# Patient Record
Sex: MALE | Race: WHITE | NOT HISPANIC OR LATINO | URBAN - METROPOLITAN AREA
[De-identification: names, ages, dates, MRNs, and addresses within clinical notes are randomized per-mention and may not be internally consistent; named-entity substitution may affect disease eponyms.]

---

## 2017-04-17 ENCOUNTER — EMERGENCY (EMERGENCY)
Facility: HOSPITAL | Age: 62
LOS: 1 days | Discharge: HOME | End: 2017-04-17
Attending: EMERGENCY MEDICINE | Admitting: EMERGENCY MEDICINE
Payer: MEDICARE

## 2017-04-17 VITALS
SYSTOLIC BLOOD PRESSURE: 115 MMHG | RESPIRATION RATE: 18 BRPM | HEART RATE: 96 BPM | DIASTOLIC BLOOD PRESSURE: 65 MMHG | OXYGEN SATURATION: 102 % | TEMPERATURE: 100 F

## 2017-04-17 VITALS
DIASTOLIC BLOOD PRESSURE: 77 MMHG | SYSTOLIC BLOOD PRESSURE: 122 MMHG | OXYGEN SATURATION: 94 % | HEART RATE: 85 BPM | RESPIRATION RATE: 17 BRPM

## 2017-04-17 DIAGNOSIS — R50.9 FEVER, UNSPECIFIED: ICD-10-CM

## 2017-04-17 DIAGNOSIS — Z41.9 ENCOUNTER FOR PROCEDURE FOR PURPOSES OTHER THAN REMEDYING HEALTH STATE, UNSPECIFIED: Chronic | ICD-10-CM

## 2017-04-17 DIAGNOSIS — H26.40 UNSPECIFIED SECONDARY CATARACT: Chronic | ICD-10-CM

## 2017-04-17 LAB
ALBUMIN SERPL ELPH-MCNC: 3.7 G/DL — SIGNIFICANT CHANGE UP (ref 3.3–5)
ALP SERPL-CCNC: 65 U/L — SIGNIFICANT CHANGE UP (ref 40–120)
ALT FLD-CCNC: 16 U/L RC — SIGNIFICANT CHANGE UP (ref 10–45)
ANION GAP SERPL CALC-SCNC: 13 MMOL/L — SIGNIFICANT CHANGE UP (ref 5–17)
AST SERPL-CCNC: 15 U/L — SIGNIFICANT CHANGE UP (ref 10–40)
BASOPHILS # BLD AUTO: 0.1 K/UL — SIGNIFICANT CHANGE UP (ref 0–0.2)
BASOPHILS NFR BLD AUTO: 0 % — SIGNIFICANT CHANGE UP (ref 0–2)
BILIRUB SERPL-MCNC: 0.2 MG/DL — SIGNIFICANT CHANGE UP (ref 0.2–1.2)
BUN SERPL-MCNC: 14 MG/DL — SIGNIFICANT CHANGE UP (ref 7–23)
CALCIUM SERPL-MCNC: 8.3 MG/DL — LOW (ref 8.4–10.5)
CHLORIDE SERPL-SCNC: 101 MMOL/L — SIGNIFICANT CHANGE UP (ref 96–108)
CO2 SERPL-SCNC: 22 MMOL/L — SIGNIFICANT CHANGE UP (ref 22–31)
CREAT SERPL-MCNC: 0.8 MG/DL — SIGNIFICANT CHANGE UP (ref 0.5–1.3)
EOSINOPHIL # BLD AUTO: 0 K/UL — SIGNIFICANT CHANGE UP (ref 0–0.5)
EOSINOPHIL NFR BLD AUTO: 0 % — SIGNIFICANT CHANGE UP (ref 0–6)
GAS PNL BLDV: SIGNIFICANT CHANGE UP
GLUCOSE SERPL-MCNC: 135 MG/DL — HIGH (ref 70–99)
HCT VFR BLD CALC: 36.1 % — LOW (ref 39–50)
HGB BLD-MCNC: 11.4 G/DL — LOW (ref 13–17)
HPIV3 RNA SPEC QL NAA+PROBE: DETECTED
LYMPHOCYTES # BLD AUTO: 0.4 K/UL — LOW (ref 1–3.3)
LYMPHOCYTES # BLD AUTO: 7 % — LOW (ref 13–44)
MCHC RBC-ENTMCNC: 30.2 PG — SIGNIFICANT CHANGE UP (ref 27–34)
MCHC RBC-ENTMCNC: 31.7 GM/DL — LOW (ref 32–36)
MCV RBC AUTO: 95.5 FL — SIGNIFICANT CHANGE UP (ref 80–100)
MONOCYTES # BLD AUTO: 1.4 K/UL — HIGH (ref 0–0.9)
MONOCYTES NFR BLD AUTO: 18 % — HIGH (ref 2–14)
NEUTROPHILS # BLD AUTO: 4.3 K/UL — SIGNIFICANT CHANGE UP (ref 1.8–7.4)
NEUTROPHILS NFR BLD AUTO: 73 % — SIGNIFICANT CHANGE UP (ref 43–77)
NEUTS BAND # BLD: 2 % — SIGNIFICANT CHANGE UP (ref 0–8)
PLAT MORPH BLD: NORMAL — SIGNIFICANT CHANGE UP
PLATELET # BLD AUTO: 222 K/UL — SIGNIFICANT CHANGE UP (ref 150–400)
POTASSIUM SERPL-MCNC: 3.8 MMOL/L — SIGNIFICANT CHANGE UP (ref 3.5–5.3)
POTASSIUM SERPL-SCNC: 3.8 MMOL/L — SIGNIFICANT CHANGE UP (ref 3.5–5.3)
PROT SERPL-MCNC: 7.2 G/DL — SIGNIFICANT CHANGE UP (ref 6–8.3)
RAPID RVP RESULT: DETECTED
RBC # BLD: 3.78 M/UL — LOW (ref 4.2–5.8)
RBC # FLD: 16.6 % — HIGH (ref 10.3–14.5)
RBC BLD AUTO: SIGNIFICANT CHANGE UP
SODIUM SERPL-SCNC: 136 MMOL/L — SIGNIFICANT CHANGE UP (ref 135–145)
TOXIC GRANULES BLD QL SMEAR: PRESENT — SIGNIFICANT CHANGE UP
WBC # BLD: 6.3 K/UL — SIGNIFICANT CHANGE UP (ref 3.8–10.5)
WBC # FLD AUTO: 6.3 K/UL — SIGNIFICANT CHANGE UP (ref 3.8–10.5)

## 2017-04-17 PROCEDURE — 99284 EMERGENCY DEPT VISIT MOD MDM: CPT | Mod: 25,GC

## 2017-04-17 PROCEDURE — 93010 ELECTROCARDIOGRAM REPORT: CPT | Mod: GC

## 2017-04-17 PROCEDURE — 71020: CPT | Mod: 26

## 2017-04-17 RX ORDER — IPRATROPIUM/ALBUTEROL SULFATE 18-103MCG
3 AEROSOL WITH ADAPTER (GRAM) INHALATION ONCE
Qty: 0 | Refills: 0 | Status: COMPLETED | OUTPATIENT
Start: 2017-04-17 | End: 2017-04-17

## 2017-04-17 RX ORDER — CEFTRIAXONE 500 MG/1
1 INJECTION, POWDER, FOR SOLUTION INTRAMUSCULAR; INTRAVENOUS EVERY 24 HOURS
Qty: 0 | Refills: 0 | Status: DISCONTINUED | OUTPATIENT
Start: 2017-04-17 | End: 2017-04-21

## 2017-04-17 RX ORDER — SODIUM CHLORIDE 9 MG/ML
1000 INJECTION INTRAMUSCULAR; INTRAVENOUS; SUBCUTANEOUS ONCE
Qty: 0 | Refills: 0 | Status: COMPLETED | OUTPATIENT
Start: 2017-04-17 | End: 2017-04-17

## 2017-04-17 RX ADMIN — SODIUM CHLORIDE 1000 MILLILITER(S): 9 INJECTION INTRAMUSCULAR; INTRAVENOUS; SUBCUTANEOUS at 13:50

## 2017-04-17 RX ADMIN — Medication 3 MILLILITER(S): at 13:40

## 2017-04-17 RX ADMIN — Medication 3 MILLILITER(S): at 13:58

## 2017-04-17 RX ADMIN — CEFTRIAXONE 100 GRAM(S): 500 INJECTION, POWDER, FOR SOLUTION INTRAMUSCULAR; INTRAVENOUS at 13:58

## 2017-04-17 RX ADMIN — Medication 3 MILLILITER(S): at 15:35

## 2017-04-17 NOTE — ED PROVIDER NOTE - OBJECTIVE STATEMENT
61 year old male with history of DM II, hypertension, multiple myeloma for 9 years, has been on IV chemotherapy every Friday, oral therapy, seen today by his oncologist Dr. Worthington in office complaining of cough, chest congestion, low grade fever of 100.8 degrees F yesterday. While patient was getting bloodwork today felt weak, dizzy, and almost passed out. No actual syncope, but still feels weak and dizzy.

## 2017-04-17 NOTE — ED PROVIDER NOTE - CARE PLAN
Principal Discharge DX:	Parainfluenza infection  Instructions for follow-up, activity and diet:	1. Drink plenty of fluids  2. Follow-up with your primary care doctor or medicine clinic at [phone] in 3-5 days   3. Follow-up with your oncologist within the next 3-5 days  4. Return immediately for any worsening or concerning symptoms Principal Discharge DX:	Parainfluenza infection  Instructions for follow-up, activity and diet:	1. Drink plenty of fluids  2. Follow-up with your primary care doctor or medicine clinic at [phone] in 3-5 days   3. Follow-up with your oncologist within the next 3-5 days  4. Return immediately for any worsening or concerning symptoms Principal Discharge DX:	Parainfluenza infection  Instructions for follow-up, activity and diet:	1. Drink plenty of fluids  2. Follow-up with your primary care doctor or medicine clinic at [phone] in 3-5 days   3. Follow-up with your oncologist within the next 3-5 days  4. Return immediately for any worsening or concerning symptoms

## 2017-04-17 NOTE — ED PROVIDER NOTE - PLAN OF CARE
1. Drink plenty of fluids  2. Follow-up with your primary care doctor or medicine clinic at [phone] in 3-5 days   3. Follow-up with your oncologist within the next 3-5 days  4. Return immediately for any worsening or concerning symptoms

## 2017-04-17 NOTE — ED ADULT NURSE NOTE - PSH
After cataract  left eye  Elective surgery  Back surgery. Kyphoplasty  Elective surgery  Stem cell transplant 2013.

## 2017-04-17 NOTE — ED PROVIDER NOTE - MEDICAL DECISION MAKING DETAILS
61 year old male with multiple medical issues and multiple myeloma with cough, fever, chills, chest congestion and near-syncopal episode. Rule out pneumonia. Blood cultures, blood work, IV antibiotics, admission to hospital. - Remi Spivey MD 61 year old male with multiple medical issues and multiple myeloma with cough, fever, chills, chest congestion and near-syncopal episode. Rule out pneumonia. Blood cultures, blood work, IV antibiotics, admission to hospital. -  ZR

## 2017-04-17 NOTE — ED ADULT NURSE NOTE - OBJECTIVE STATEMENT
62 y/o M, reported to ED from PMD office via EMS. A&Ox3, s/p near syncope. Pt reports that he went to his PMD office today when he wasn't feeling well. Pt reports that he started having a fever last night of 100.3. Pt reports that he treated the fever with Tylenol and it decreased his temp. Pt is afebrile upon arrival to ED with temp of 99.7 oral. Pt was at his PMD office getting blood taken when he almost collapsed. Pt reports that he has a hx of multiple myeloma.

## 2017-04-17 NOTE — ED PROVIDER NOTE - PROGRESS NOTE DETAILS
patient re-assessed. feels significantly improved. wishes to be d/c'd home. discussed proper follow-up and indications to return to ED. patient feels comfortable with plan and will be very cautious, will return if any worsening symptoms. - Remi Spivey MD

## 2017-04-17 NOTE — ED ADULT NURSE NOTE - PMH
DM (diabetes mellitus)    HTN (hypertension)    Hypercholesteremia    Multiple myeloma    Vitamin D deficiency

## 2017-04-19 ENCOUNTER — INPATIENT (INPATIENT)
Facility: HOSPITAL | Age: 62
LOS: 3 days | Discharge: HOME | DRG: 202 | End: 2017-04-23
Attending: INTERNAL MEDICINE | Admitting: INTERNAL MEDICINE
Payer: MEDICARE

## 2017-04-19 VITALS
TEMPERATURE: 101 F | HEART RATE: 111 BPM | OXYGEN SATURATION: 95 % | DIASTOLIC BLOOD PRESSURE: 82 MMHG | RESPIRATION RATE: 19 BRPM | SYSTOLIC BLOOD PRESSURE: 128 MMHG

## 2017-04-19 DIAGNOSIS — Z41.9 ENCOUNTER FOR PROCEDURE FOR PURPOSES OTHER THAN REMEDYING HEALTH STATE, UNSPECIFIED: Chronic | ICD-10-CM

## 2017-04-19 DIAGNOSIS — R50.9 FEVER, UNSPECIFIED: ICD-10-CM

## 2017-04-19 DIAGNOSIS — H26.40 UNSPECIFIED SECONDARY CATARACT: Chronic | ICD-10-CM

## 2017-04-19 LAB
ALBUMIN SERPL ELPH-MCNC: 3.9 G/DL — SIGNIFICANT CHANGE UP (ref 3.3–5)
ALP SERPL-CCNC: 61 U/L — SIGNIFICANT CHANGE UP (ref 40–120)
ALT FLD-CCNC: 17 U/L RC — SIGNIFICANT CHANGE UP (ref 10–45)
ANION GAP SERPL CALC-SCNC: 13 MMOL/L — SIGNIFICANT CHANGE UP (ref 5–17)
APTT BLD: 30.2 SEC — SIGNIFICANT CHANGE UP (ref 27.5–37.4)
AST SERPL-CCNC: 13 U/L — SIGNIFICANT CHANGE UP (ref 10–40)
BASE EXCESS BLDV CALC-SCNC: 1.6 MMOL/L — SIGNIFICANT CHANGE UP (ref -2–2)
BASOPHILS # BLD AUTO: 0 K/UL — SIGNIFICANT CHANGE UP (ref 0–0.2)
BASOPHILS NFR BLD AUTO: 0.2 % — SIGNIFICANT CHANGE UP (ref 0–2)
BILIRUB SERPL-MCNC: 0.3 MG/DL — SIGNIFICANT CHANGE UP (ref 0.2–1.2)
BUN SERPL-MCNC: 12 MG/DL — SIGNIFICANT CHANGE UP (ref 7–23)
CA-I SERPL-SCNC: 1.1 MMOL/L — LOW (ref 1.12–1.3)
CALCIUM SERPL-MCNC: 8.6 MG/DL — SIGNIFICANT CHANGE UP (ref 8.4–10.5)
CHLORIDE BLDV-SCNC: 103 MMOL/L — SIGNIFICANT CHANGE UP (ref 96–108)
CHLORIDE SERPL-SCNC: 97 MMOL/L — SIGNIFICANT CHANGE UP (ref 96–108)
CO2 BLDV-SCNC: 29 MMOL/L — SIGNIFICANT CHANGE UP (ref 22–30)
CO2 SERPL-SCNC: 25 MMOL/L — SIGNIFICANT CHANGE UP (ref 22–31)
CREAT SERPL-MCNC: 0.84 MG/DL — SIGNIFICANT CHANGE UP (ref 0.5–1.3)
EOSINOPHIL # BLD AUTO: 0 K/UL — SIGNIFICANT CHANGE UP (ref 0–0.5)
EOSINOPHIL NFR BLD AUTO: 0.1 % — SIGNIFICANT CHANGE UP (ref 0–6)
GAS PNL BLDV: 133 MMOL/L — LOW (ref 136–145)
GAS PNL BLDV: SIGNIFICANT CHANGE UP
GAS PNL BLDV: SIGNIFICANT CHANGE UP
GLUCOSE BLDV-MCNC: 160 MG/DL — HIGH (ref 70–99)
GLUCOSE SERPL-MCNC: 157 MG/DL — HIGH (ref 70–99)
GRAM STN FLD: SIGNIFICANT CHANGE UP
HCO3 BLDV-SCNC: 28 MMOL/L — SIGNIFICANT CHANGE UP (ref 21–29)
HCT VFR BLD CALC: 36.3 % — LOW (ref 39–50)
HCT VFR BLDA CALC: 37 % — LOW (ref 39–50)
HGB BLD CALC-MCNC: 12.1 G/DL — LOW (ref 13–17)
HGB BLD-MCNC: 11.8 G/DL — LOW (ref 13–17)
INR BLD: 2.12 RATIO — HIGH (ref 0.88–1.16)
LACTATE BLDV-MCNC: 2 MMOL/L — SIGNIFICANT CHANGE UP (ref 0.7–2)
LYMPHOCYTES # BLD AUTO: 0.4 K/UL — LOW (ref 1–3.3)
LYMPHOCYTES # BLD AUTO: 7.1 % — LOW (ref 13–44)
MCHC RBC-ENTMCNC: 30.8 PG — SIGNIFICANT CHANGE UP (ref 27–34)
MCHC RBC-ENTMCNC: 32.4 GM/DL — SIGNIFICANT CHANGE UP (ref 32–36)
MCV RBC AUTO: 95.2 FL — SIGNIFICANT CHANGE UP (ref 80–100)
MONOCYTES # BLD AUTO: 1.2 K/UL — HIGH (ref 0–0.9)
MONOCYTES NFR BLD AUTO: 21.4 % — HIGH (ref 2–14)
NEUTROPHILS # BLD AUTO: 4 K/UL — SIGNIFICANT CHANGE UP (ref 1.8–7.4)
NEUTROPHILS NFR BLD AUTO: 71.2 % — SIGNIFICANT CHANGE UP (ref 43–77)
OTHER CELLS CSF MANUAL: 5 ML/DL — LOW (ref 18–22)
PCO2 BLDV: 52 MMHG — HIGH (ref 35–50)
PH BLDV: 7.35 — SIGNIFICANT CHANGE UP (ref 7.35–7.45)
PLAT MORPH BLD: NORMAL — SIGNIFICANT CHANGE UP
PLATELET # BLD AUTO: 180 K/UL — SIGNIFICANT CHANGE UP (ref 150–400)
PO2 BLDV: 22 MMHG — LOW (ref 25–45)
POTASSIUM BLDV-SCNC: 3.9 MMOL/L — SIGNIFICANT CHANGE UP (ref 3.5–5)
POTASSIUM SERPL-MCNC: 4.3 MMOL/L — SIGNIFICANT CHANGE UP (ref 3.5–5.3)
POTASSIUM SERPL-SCNC: 4.3 MMOL/L — SIGNIFICANT CHANGE UP (ref 3.5–5.3)
PROT SERPL-MCNC: 7.5 G/DL — SIGNIFICANT CHANGE UP (ref 6–8.3)
PROTHROM AB SERPL-ACNC: 23.2 SEC — HIGH (ref 9.8–12.7)
RBC # BLD: 3.82 M/UL — LOW (ref 4.2–5.8)
RBC # FLD: 16.5 % — HIGH (ref 10.3–14.5)
RBC BLD AUTO: NORMAL — SIGNIFICANT CHANGE UP
SAO2 % BLDV: 31 % — LOW (ref 67–88)
SODIUM SERPL-SCNC: 135 MMOL/L — SIGNIFICANT CHANGE UP (ref 135–145)
WBC # BLD: 5.7 K/UL — SIGNIFICANT CHANGE UP (ref 3.8–10.5)
WBC # FLD AUTO: 5.7 K/UL — SIGNIFICANT CHANGE UP (ref 3.8–10.5)

## 2017-04-19 PROCEDURE — 71020: CPT | Mod: 26

## 2017-04-19 PROCEDURE — 99285 EMERGENCY DEPT VISIT HI MDM: CPT

## 2017-04-19 RX ORDER — ASPIRIN/CALCIUM CARB/MAGNESIUM 324 MG
325 TABLET ORAL DAILY
Qty: 0 | Refills: 0 | Status: DISCONTINUED | OUTPATIENT
Start: 2017-04-19 | End: 2017-04-19

## 2017-04-19 RX ORDER — INSULIN LISPRO 100/ML
VIAL (ML) SUBCUTANEOUS
Qty: 0 | Refills: 0 | Status: DISCONTINUED | OUTPATIENT
Start: 2017-04-19 | End: 2017-04-23

## 2017-04-19 RX ORDER — ASPIRIN/CALCIUM CARB/MAGNESIUM 324 MG
81 TABLET ORAL DAILY
Qty: 0 | Refills: 0 | Status: DISCONTINUED | OUTPATIENT
Start: 2017-04-19 | End: 2017-04-23

## 2017-04-19 RX ORDER — TIOTROPIUM BROMIDE 18 UG/1
1 CAPSULE ORAL; RESPIRATORY (INHALATION) DAILY
Qty: 0 | Refills: 0 | Status: DISCONTINUED | OUTPATIENT
Start: 2017-04-19 | End: 2017-04-23

## 2017-04-19 RX ORDER — DEXTROSE 50 % IN WATER 50 %
25 SYRINGE (ML) INTRAVENOUS ONCE
Qty: 0 | Refills: 0 | Status: DISCONTINUED | OUTPATIENT
Start: 2017-04-19 | End: 2017-04-23

## 2017-04-19 RX ORDER — IPRATROPIUM/ALBUTEROL SULFATE 18-103MCG
3 AEROSOL WITH ADAPTER (GRAM) INHALATION EVERY 6 HOURS
Qty: 0 | Refills: 0 | Status: DISCONTINUED | OUTPATIENT
Start: 2017-04-19 | End: 2017-04-22

## 2017-04-19 RX ORDER — ACYCLOVIR SODIUM 500 MG
400 VIAL (EA) INTRAVENOUS DAILY
Qty: 0 | Refills: 0 | Status: DISCONTINUED | OUTPATIENT
Start: 2017-04-19 | End: 2017-04-23

## 2017-04-19 RX ORDER — GLUCAGON INJECTION, SOLUTION 0.5 MG/.1ML
1 INJECTION, SOLUTION SUBCUTANEOUS ONCE
Qty: 0 | Refills: 0 | Status: DISCONTINUED | OUTPATIENT
Start: 2017-04-19 | End: 2017-04-23

## 2017-04-19 RX ORDER — CEFEPIME 1 G/1
INJECTION, POWDER, FOR SOLUTION INTRAMUSCULAR; INTRAVENOUS
Qty: 0 | Refills: 0 | Status: DISCONTINUED | OUTPATIENT
Start: 2017-04-19 | End: 2017-04-20

## 2017-04-19 RX ORDER — SODIUM CHLORIDE 9 MG/ML
2000 INJECTION INTRAMUSCULAR; INTRAVENOUS; SUBCUTANEOUS ONCE
Qty: 0 | Refills: 0 | Status: COMPLETED | OUTPATIENT
Start: 2017-04-19 | End: 2017-04-19

## 2017-04-19 RX ORDER — WARFARIN SODIUM 2.5 MG/1
5 TABLET ORAL ONCE
Qty: 0 | Refills: 0 | Status: COMPLETED | OUTPATIENT
Start: 2017-04-19 | End: 2017-04-19

## 2017-04-19 RX ORDER — CEFEPIME 1 G/1
2000 INJECTION, POWDER, FOR SOLUTION INTRAMUSCULAR; INTRAVENOUS EVERY 12 HOURS
Qty: 0 | Refills: 0 | Status: DISCONTINUED | OUTPATIENT
Start: 2017-04-20 | End: 2017-04-20

## 2017-04-19 RX ORDER — SENNA PLUS 8.6 MG/1
2 TABLET ORAL AT BEDTIME
Qty: 0 | Refills: 0 | Status: DISCONTINUED | OUTPATIENT
Start: 2017-04-19 | End: 2017-04-23

## 2017-04-19 RX ORDER — CEFEPIME 1 G/1
2000 INJECTION, POWDER, FOR SOLUTION INTRAMUSCULAR; INTRAVENOUS ONCE
Qty: 0 | Refills: 0 | Status: COMPLETED | OUTPATIENT
Start: 2017-04-19 | End: 2017-04-19

## 2017-04-19 RX ORDER — SODIUM CHLORIDE 9 MG/ML
1000 INJECTION, SOLUTION INTRAVENOUS
Qty: 0 | Refills: 0 | Status: DISCONTINUED | OUTPATIENT
Start: 2017-04-19 | End: 2017-04-23

## 2017-04-19 RX ORDER — ZOLPIDEM TARTRATE 10 MG/1
5 TABLET ORAL AT BEDTIME
Qty: 0 | Refills: 0 | Status: DISCONTINUED | OUTPATIENT
Start: 2017-04-19 | End: 2017-04-23

## 2017-04-19 RX ORDER — SODIUM CHLORIDE 9 MG/ML
1000 INJECTION INTRAMUSCULAR; INTRAVENOUS; SUBCUTANEOUS
Qty: 0 | Refills: 0 | Status: DISCONTINUED | OUTPATIENT
Start: 2017-04-19 | End: 2017-04-23

## 2017-04-19 RX ORDER — ACETAMINOPHEN 500 MG
1000 TABLET ORAL ONCE
Qty: 0 | Refills: 0 | Status: COMPLETED | OUTPATIENT
Start: 2017-04-19 | End: 2017-04-19

## 2017-04-19 RX ORDER — VANCOMYCIN HCL 1 G
1000 VIAL (EA) INTRAVENOUS EVERY 12 HOURS
Qty: 0 | Refills: 0 | Status: DISCONTINUED | OUTPATIENT
Start: 2017-04-19 | End: 2017-04-21

## 2017-04-19 RX ORDER — IPRATROPIUM/ALBUTEROL SULFATE 18-103MCG
3 AEROSOL WITH ADAPTER (GRAM) INHALATION ONCE
Qty: 0 | Refills: 0 | Status: COMPLETED | OUTPATIENT
Start: 2017-04-19 | End: 2017-04-19

## 2017-04-19 RX ORDER — DEXTROSE 50 % IN WATER 50 %
1 SYRINGE (ML) INTRAVENOUS ONCE
Qty: 0 | Refills: 0 | Status: DISCONTINUED | OUTPATIENT
Start: 2017-04-19 | End: 2017-04-23

## 2017-04-19 RX ORDER — METOPROLOL TARTRATE 50 MG
25 TABLET ORAL
Qty: 0 | Refills: 0 | Status: DISCONTINUED | OUTPATIENT
Start: 2017-04-19 | End: 2017-04-23

## 2017-04-19 RX ORDER — SIMVASTATIN 20 MG/1
20 TABLET, FILM COATED ORAL AT BEDTIME
Qty: 0 | Refills: 0 | Status: DISCONTINUED | OUTPATIENT
Start: 2017-04-19 | End: 2017-04-23

## 2017-04-19 RX ORDER — ALBUTEROL 90 UG/1
1 AEROSOL, METERED ORAL EVERY 4 HOURS
Qty: 0 | Refills: 0 | Status: DISCONTINUED | OUTPATIENT
Start: 2017-04-19 | End: 2017-04-22

## 2017-04-19 RX ORDER — DEXTROSE 50 % IN WATER 50 %
12.5 SYRINGE (ML) INTRAVENOUS ONCE
Qty: 0 | Refills: 0 | Status: DISCONTINUED | OUTPATIENT
Start: 2017-04-19 | End: 2017-04-23

## 2017-04-19 RX ORDER — VANCOMYCIN HCL 1 G
1000 VIAL (EA) INTRAVENOUS ONCE
Qty: 0 | Refills: 0 | Status: COMPLETED | OUTPATIENT
Start: 2017-04-19 | End: 2017-04-19

## 2017-04-19 RX ADMIN — ZOLPIDEM TARTRATE 5 MILLIGRAM(S): 10 TABLET ORAL at 22:04

## 2017-04-19 RX ADMIN — CEFEPIME 100 MILLIGRAM(S): 1 INJECTION, POWDER, FOR SOLUTION INTRAMUSCULAR; INTRAVENOUS at 13:50

## 2017-04-19 RX ADMIN — Medication 1: at 16:28

## 2017-04-19 RX ADMIN — SIMVASTATIN 20 MILLIGRAM(S): 20 TABLET, FILM COATED ORAL at 22:01

## 2017-04-19 RX ADMIN — Medication 400 MILLIGRAM(S): at 13:10

## 2017-04-19 RX ADMIN — Medication 250 MILLIGRAM(S): at 13:10

## 2017-04-19 RX ADMIN — SODIUM CHLORIDE 50 MILLILITER(S): 9 INJECTION INTRAMUSCULAR; INTRAVENOUS; SUBCUTANEOUS at 22:08

## 2017-04-19 RX ADMIN — Medication 3 MILLILITER(S): at 18:03

## 2017-04-19 RX ADMIN — Medication 40 MILLIGRAM(S): at 20:39

## 2017-04-19 RX ADMIN — Medication 3 MILLILITER(S): at 13:10

## 2017-04-19 RX ADMIN — Medication 25 MILLIGRAM(S): at 18:03

## 2017-04-19 RX ADMIN — SODIUM CHLORIDE 2000 MILLILITER(S): 9 INJECTION INTRAMUSCULAR; INTRAVENOUS; SUBCUTANEOUS at 13:10

## 2017-04-19 RX ADMIN — WARFARIN SODIUM 5 MILLIGRAM(S): 2.5 TABLET ORAL at 22:04

## 2017-04-19 RX ADMIN — SENNA PLUS 2 TABLET(S): 8.6 TABLET ORAL at 22:01

## 2017-04-19 RX ADMIN — Medication 81 MILLIGRAM(S): at 22:03

## 2017-04-19 RX ADMIN — Medication 400 MILLIGRAM(S): at 22:03

## 2017-04-19 NOTE — H&P ADULT. - HISTORY OF PRESENT ILLNESS
t: 61 y.o. male  with  h/o  myeloma, called  by  er  to  be  admitted   for + BC's of gram positive cocci in clusters.  Pt has a history of DM II, MM on pomalidomide, recent blood clot on coumadin.  Was in the ED several days ago for fever and generalized malaise, .  Was sent home but called back today.  Here today with worsening cough, febrile of 102 this morning. and  positive  blood  c/s,  pt  called  to  er  for  admission,    No chest pain or shortness of breath.  No belly pain, nausea or vomiting. t: 61 y.o. male  with  h/o  myeloma, called  by  er  to  be  admitted   for + BC's of gram positive cocci in clusters.  Pt has a history of DM II, MM on pomalidomide, recent blood clot on coumadin.  Was in the ED several days ago for fever and generalized malaise,parainfluemza positive,  .  Was sent home but called back today.  Here today with worsening cough, febrile of 102 this morning. and  positive  blood  c/s,  pt  called  to  er  for  admission,    No chest pain or shortness of breath.  No belly pain, nausea or vomiting.

## 2017-04-19 NOTE — ED PROVIDER NOTE - OBJECTIVE STATEMENT
61 y.o. male called back for + BC's of gram positive cocci in clusters.  Pt has a 61 y.o. male called back for + BC's of gram positive cocci in clusters.  Pt has a history of DM II, MM on pomalidomide, recent blood clot on coumadin.  Was in the ED several days ago for fever and generalized malaise, had normal BW normal CxR and + parainfluenza.  Was sent home but called back today.  Here today with worsening cough, febrile of 102 this morning.  No antipyretics at the moment.  No chest pain or shortness of breath.  No belly pain, nausea or vomiting.

## 2017-04-19 NOTE — ED PROVIDER NOTE - ATTENDING CONTRIBUTION TO CARE
I, Dr. Luis Carlos Ames, saw and examined this patient and discussed the plan of care with the PA. Pt called back for GPC 1/2 bottles, has parainfl.

## 2017-04-19 NOTE — ED ADULT NURSE NOTE - OBJECTIVE STATEMENT
Patient states that he was seen in the ED on Monday and was diagnosed with the flu. Patient states that he was discharged home and has been having increased SOB and wheezing. Patient states that he has also been running fevers at home of 101.0. Pt states that he took Tylenol this morning at 0600. Patient has audible wheezing noted at this time. Patient oxygen 93% on RA. Patient placed on oxygen for comfort. Patient denies any CP. Patient states that he was called at home and told that his blood cultures from Monday were positive. Patient A/Ox4

## 2017-04-19 NOTE — ED ADULT NURSE NOTE - CAS EDN DISCHARGE ASSESSMENT
Alert and oriented to person, place and time/Awake/Patient baseline mental status/No adverse reaction to first time med in ED

## 2017-04-19 NOTE — ED ADULT NURSE REASSESSMENT NOTE - NS ED NURSE REASSESS COMMENT FT1
Patient continues to rest in bed at this time with family at the bedside. Patient updated on progress. Patient states that he is still coughing and a little SOB. Patients respirations are even and non labored. NAD noted

## 2017-04-19 NOTE — H&P ADULT. - ASSESSMENT
pt  with  fevers, bacteremia,  on  cefepime,  and  vanco,  ID  called,  h/o  myeloma, htn,  dm  2, on  coumadin  for  h/o  [E/ DVT  dx 12.  16, onc  dr snider  will  f/p

## 2017-04-19 NOTE — ED ADULT TRIAGE NOTE - CHIEF COMPLAINT QUOTE
Called back for +blood cultures. Patient reporting cough, fever. Currently febrile, tachycardic Called back for +blood cultures. Patient reporting cough, fever. Currently febrile, tachycardic. On chemo

## 2017-04-19 NOTE — ED PROVIDER NOTE - CHPI ED SYMPTOMS NEG
no abdominal pain/no headache/no diarrhea/no shortness of breath/no vomiting/no decreased eating/drinking

## 2017-04-19 NOTE — ED POST DISCHARGE NOTE - RESULT SUMMARY
+Blood culture, called and spoke with patient and advised that he return to the ER as soon as possible, he states that he is feeling worse since he left the ER on monday and agreed that he will come in. Rich Li PA-C.

## 2017-04-19 NOTE — ED ADULT NURSE REASSESSMENT NOTE - NS ED NURSE REASSESS COMMENT FT1
Patient continues to rest in bed at this time with family at the bedside. Patient updated on progress. NAD noted

## 2017-04-19 NOTE — ED PROVIDER NOTE - CARE PLAN
Principal Discharge DX:	Fever  Secondary Diagnosis:	Multiple myeloma  Secondary Diagnosis:	Bacteremia

## 2017-04-19 NOTE — ED ADULT NURSE NOTE - CHIEF COMPLAINT QUOTE
Called back for +blood cultures. Patient reporting cough, fever. Currently febrile, tachycardic. Currently on chemo

## 2017-04-20 LAB
CULTURE RESULTS: SIGNIFICANT CHANGE UP
INR BLD: 1.86 RATIO — HIGH (ref 0.88–1.16)
PROTHROM AB SERPL-ACNC: 20.5 SEC — HIGH (ref 9.8–12.7)
SPECIMEN SOURCE: SIGNIFICANT CHANGE UP

## 2017-04-20 RX ORDER — WARFARIN SODIUM 2.5 MG/1
6 TABLET ORAL ONCE
Qty: 0 | Refills: 0 | Status: COMPLETED | OUTPATIENT
Start: 2017-04-20 | End: 2017-04-20

## 2017-04-20 RX ORDER — INSULIN LISPRO 100/ML
VIAL (ML) SUBCUTANEOUS AT BEDTIME
Qty: 0 | Refills: 0 | Status: DISCONTINUED | OUTPATIENT
Start: 2017-04-20 | End: 2017-04-23

## 2017-04-20 RX ADMIN — Medication 20 MILLIGRAM(S): at 06:58

## 2017-04-20 RX ADMIN — SIMVASTATIN 20 MILLIGRAM(S): 20 TABLET, FILM COATED ORAL at 21:55

## 2017-04-20 RX ADMIN — Medication 1 DROP(S): at 13:29

## 2017-04-20 RX ADMIN — Medication 1 DROP(S): at 07:02

## 2017-04-20 RX ADMIN — Medication 1 DROP(S): at 16:52

## 2017-04-20 RX ADMIN — ZOLPIDEM TARTRATE 5 MILLIGRAM(S): 10 TABLET ORAL at 21:54

## 2017-04-20 RX ADMIN — Medication 20 MILLIGRAM(S): at 13:30

## 2017-04-20 RX ADMIN — Medication 20 MILLIGRAM(S): at 21:54

## 2017-04-20 RX ADMIN — Medication 1: at 13:29

## 2017-04-20 RX ADMIN — Medication 25 MILLIGRAM(S): at 16:51

## 2017-04-20 RX ADMIN — Medication 250 MILLIGRAM(S): at 16:51

## 2017-04-20 RX ADMIN — WARFARIN SODIUM 6 MILLIGRAM(S): 2.5 TABLET ORAL at 21:54

## 2017-04-20 RX ADMIN — Medication 20 MILLIGRAM(S): at 16:51

## 2017-04-20 RX ADMIN — Medication 250 MILLIGRAM(S): at 04:23

## 2017-04-20 RX ADMIN — Medication 25 MILLIGRAM(S): at 06:58

## 2017-04-20 RX ADMIN — Medication 3 MILLILITER(S): at 13:30

## 2017-04-20 RX ADMIN — Medication 3 MILLILITER(S): at 01:00

## 2017-04-20 RX ADMIN — SENNA PLUS 2 TABLET(S): 8.6 TABLET ORAL at 21:55

## 2017-04-20 RX ADMIN — Medication 1: at 09:06

## 2017-04-20 RX ADMIN — Medication 400 MILLIGRAM(S): at 13:29

## 2017-04-20 RX ADMIN — Medication 3 MILLILITER(S): at 06:57

## 2017-04-20 RX ADMIN — Medication 81 MILLIGRAM(S): at 13:30

## 2017-04-20 RX ADMIN — Medication 3 MILLILITER(S): at 17:37

## 2017-04-20 RX ADMIN — Medication 3: at 17:42

## 2017-04-20 RX ADMIN — CEFEPIME 100 MILLIGRAM(S): 1 INJECTION, POWDER, FOR SOLUTION INTRAMUSCULAR; INTRAVENOUS at 06:57

## 2017-04-20 NOTE — PATIENT PROFILE ADULT. - NS TRANSFER PATIENT BELONGINGS
Jewelry/Money (specify)/Clothing/Other belongings/wallet; reading glasses; $60.00/Cell Phone/PDA (specify)

## 2017-04-21 LAB
ANION GAP SERPL CALC-SCNC: 10 MMOL/L — SIGNIFICANT CHANGE UP (ref 5–17)
BUN SERPL-MCNC: 20 MG/DL — SIGNIFICANT CHANGE UP (ref 7–23)
CALCIUM SERPL-MCNC: 8.8 MG/DL — SIGNIFICANT CHANGE UP (ref 8.4–10.5)
CHLORIDE SERPL-SCNC: 105 MMOL/L — SIGNIFICANT CHANGE UP (ref 96–108)
CO2 SERPL-SCNC: 25 MMOL/L — SIGNIFICANT CHANGE UP (ref 22–31)
CREAT SERPL-MCNC: 0.78 MG/DL — SIGNIFICANT CHANGE UP (ref 0.5–1.3)
GLUCOSE SERPL-MCNC: 210 MG/DL — HIGH (ref 70–99)
HBA1C BLD-MCNC: 7.2 % — HIGH (ref 4–5.6)
HCT VFR BLD CALC: 34.9 % — LOW (ref 39–50)
HGB BLD-MCNC: 11.1 G/DL — LOW (ref 13–17)
INR BLD: 1.87 RATIO — HIGH (ref 0.88–1.16)
MCHC RBC-ENTMCNC: 30.5 PG — SIGNIFICANT CHANGE UP (ref 27–34)
MCHC RBC-ENTMCNC: 31.9 GM/DL — LOW (ref 32–36)
MCV RBC AUTO: 95.5 FL — SIGNIFICANT CHANGE UP (ref 80–100)
PLATELET # BLD AUTO: 170 K/UL — SIGNIFICANT CHANGE UP (ref 150–400)
POTASSIUM SERPL-MCNC: 4.8 MMOL/L — SIGNIFICANT CHANGE UP (ref 3.5–5.3)
POTASSIUM SERPL-SCNC: 4.8 MMOL/L — SIGNIFICANT CHANGE UP (ref 3.5–5.3)
PROTHROM AB SERPL-ACNC: 21.4 SEC — HIGH (ref 10–13.1)
RBC # BLD: 3.66 M/UL — LOW (ref 4.2–5.8)
RBC # FLD: 16.5 % — HIGH (ref 10.3–14.5)
SODIUM SERPL-SCNC: 140 MMOL/L — SIGNIFICANT CHANGE UP (ref 135–145)
VANCOMYCIN TROUGH SERPL-MCNC: 6.4 UG/ML — LOW (ref 10–20)
WBC # BLD: 3.6 K/UL — LOW (ref 3.8–10.5)
WBC # FLD AUTO: 3.6 K/UL — LOW (ref 3.8–10.5)

## 2017-04-21 RX ORDER — VANCOMYCIN HCL 1 G
1250 VIAL (EA) INTRAVENOUS EVERY 12 HOURS
Qty: 0 | Refills: 0 | Status: DISCONTINUED | OUTPATIENT
Start: 2017-04-21 | End: 2017-04-21

## 2017-04-21 RX ORDER — WARFARIN SODIUM 2.5 MG/1
6 TABLET ORAL ONCE
Qty: 0 | Refills: 0 | Status: COMPLETED | OUTPATIENT
Start: 2017-04-21 | End: 2017-04-21

## 2017-04-21 RX ADMIN — Medication 2: at 18:44

## 2017-04-21 RX ADMIN — Medication 81 MILLIGRAM(S): at 13:04

## 2017-04-21 RX ADMIN — Medication 20 MILLIGRAM(S): at 21:48

## 2017-04-21 RX ADMIN — Medication 1 DROP(S): at 13:04

## 2017-04-21 RX ADMIN — WARFARIN SODIUM 6 MILLIGRAM(S): 2.5 TABLET ORAL at 21:48

## 2017-04-21 RX ADMIN — Medication 3 MILLILITER(S): at 23:40

## 2017-04-21 RX ADMIN — Medication 3 MILLILITER(S): at 05:04

## 2017-04-21 RX ADMIN — Medication 20 MILLIGRAM(S): at 14:48

## 2017-04-21 RX ADMIN — Medication 25 MILLIGRAM(S): at 05:04

## 2017-04-21 RX ADMIN — Medication 3 MILLILITER(S): at 13:04

## 2017-04-21 RX ADMIN — Medication 20 MILLIGRAM(S): at 05:04

## 2017-04-21 RX ADMIN — Medication 400 MILLIGRAM(S): at 13:04

## 2017-04-21 RX ADMIN — Medication 2: at 14:39

## 2017-04-21 RX ADMIN — SENNA PLUS 2 TABLET(S): 8.6 TABLET ORAL at 21:48

## 2017-04-21 RX ADMIN — Medication 1 DROP(S): at 17:51

## 2017-04-21 RX ADMIN — ZOLPIDEM TARTRATE 5 MILLIGRAM(S): 10 TABLET ORAL at 21:48

## 2017-04-21 RX ADMIN — Medication 25 MILLIGRAM(S): at 17:52

## 2017-04-21 RX ADMIN — Medication 2: at 08:20

## 2017-04-21 RX ADMIN — Medication 166.67 MILLIGRAM(S): at 05:03

## 2017-04-21 RX ADMIN — SIMVASTATIN 20 MILLIGRAM(S): 20 TABLET, FILM COATED ORAL at 21:48

## 2017-04-21 RX ADMIN — Medication 3 MILLILITER(S): at 17:52

## 2017-04-21 RX ADMIN — Medication 1 DROP(S): at 05:04

## 2017-04-21 NOTE — PROVIDER CONTACT NOTE (MEDICATION) - SITUATION
Pt current IVPB Vanco dose 1,000mg q12h, Vanco trough was drawn pre 4th dose and resulted subtherapeutic 6.4

## 2017-04-21 NOTE — PROVIDER CONTACT NOTE (MEDICATION) - ACTION/TREATMENT ORDERED:
NP [place] aware. Will increase dose from Vanco IVPB 1,000mg q12h to Vanco 1,250mg q12h - first increased dose to be administered this AM. Will continue to monitor.

## 2017-04-22 ENCOUNTER — TRANSCRIPTION ENCOUNTER (OUTPATIENT)
Age: 62
End: 2017-04-22

## 2017-04-22 LAB
CRP SERPL-MCNC: 0.66 MG/DL — HIGH (ref 0–0.4)
CULTURE RESULTS: SIGNIFICANT CHANGE UP
ERYTHROCYTE [SEDIMENTATION RATE] IN BLOOD: 51 MM/HR — HIGH (ref 0–20)
HCT VFR BLD CALC: 34.3 % — LOW (ref 39–50)
HGB BLD-MCNC: 10.9 G/DL — LOW (ref 13–17)
INR BLD: 2.3 RATIO — HIGH (ref 0.88–1.16)
MCHC RBC-ENTMCNC: 30.1 PG — SIGNIFICANT CHANGE UP (ref 27–34)
MCHC RBC-ENTMCNC: 31.8 GM/DL — LOW (ref 32–36)
MCV RBC AUTO: 94.8 FL — SIGNIFICANT CHANGE UP (ref 80–100)
PLATELET # BLD AUTO: 192 K/UL — SIGNIFICANT CHANGE UP (ref 150–400)
PROTHROM AB SERPL-ACNC: 26.4 SEC — HIGH (ref 10–13.1)
RBC # BLD: 3.62 M/UL — LOW (ref 4.2–5.8)
RBC # FLD: 17 % — HIGH (ref 10.3–14.5)
SPECIMEN SOURCE: SIGNIFICANT CHANGE UP
WBC # BLD: 4.03 K/UL — SIGNIFICANT CHANGE UP (ref 3.8–10.5)
WBC # FLD AUTO: 4.03 K/UL — SIGNIFICANT CHANGE UP (ref 3.8–10.5)

## 2017-04-22 RX ORDER — SIMVASTATIN 20 MG/1
1 TABLET, FILM COATED ORAL
Qty: 0 | Refills: 0 | DISCHARGE
Start: 2017-04-22

## 2017-04-22 RX ORDER — METOPROLOL TARTRATE 50 MG
1 TABLET ORAL
Qty: 0 | Refills: 0 | DISCHARGE
Start: 2017-04-22

## 2017-04-22 RX ORDER — WARFARIN SODIUM 2.5 MG/1
5 TABLET ORAL ONCE
Qty: 0 | Refills: 0 | Status: COMPLETED | OUTPATIENT
Start: 2017-04-22 | End: 2017-04-22

## 2017-04-22 RX ORDER — ALBUTEROL 90 UG/1
2 AEROSOL, METERED ORAL EVERY 6 HOURS
Qty: 0 | Refills: 0 | Status: DISCONTINUED | OUTPATIENT
Start: 2017-04-22 | End: 2017-04-22

## 2017-04-22 RX ORDER — ASPIRIN/CALCIUM CARB/MAGNESIUM 324 MG
1 TABLET ORAL
Qty: 0 | Refills: 0 | DISCHARGE
Start: 2017-04-22

## 2017-04-22 RX ORDER — ACYCLOVIR SODIUM 500 MG
1 VIAL (EA) INTRAVENOUS
Qty: 0 | Refills: 0 | DISCHARGE
Start: 2017-04-22

## 2017-04-22 RX ORDER — SENNA PLUS 8.6 MG/1
2 TABLET ORAL
Qty: 0 | Refills: 0 | DISCHARGE
Start: 2017-04-22

## 2017-04-22 RX ADMIN — Medication 1 DROP(S): at 17:42

## 2017-04-22 RX ADMIN — Medication 1 DROP(S): at 12:42

## 2017-04-22 RX ADMIN — Medication 20 MILLIGRAM(S): at 13:33

## 2017-04-22 RX ADMIN — Medication 400 MILLIGRAM(S): at 12:42

## 2017-04-22 RX ADMIN — Medication 81 MILLIGRAM(S): at 12:42

## 2017-04-22 RX ADMIN — Medication 1 DROP(S): at 06:35

## 2017-04-22 RX ADMIN — Medication 25 MILLIGRAM(S): at 17:42

## 2017-04-22 RX ADMIN — SIMVASTATIN 20 MILLIGRAM(S): 20 TABLET, FILM COATED ORAL at 21:54

## 2017-04-22 RX ADMIN — Medication 1: at 13:33

## 2017-04-22 RX ADMIN — Medication 2: at 17:42

## 2017-04-22 RX ADMIN — Medication 20 MILLIGRAM(S): at 21:53

## 2017-04-22 RX ADMIN — WARFARIN SODIUM 5 MILLIGRAM(S): 2.5 TABLET ORAL at 21:54

## 2017-04-22 RX ADMIN — Medication 20 MILLIGRAM(S): at 06:36

## 2017-04-22 RX ADMIN — Medication 1: at 08:24

## 2017-04-22 RX ADMIN — ZOLPIDEM TARTRATE 5 MILLIGRAM(S): 10 TABLET ORAL at 21:54

## 2017-04-22 RX ADMIN — Medication 3 MILLILITER(S): at 06:35

## 2017-04-22 RX ADMIN — Medication 25 MILLIGRAM(S): at 06:38

## 2017-04-22 RX ADMIN — Medication 40 MILLIGRAM(S): at 12:42

## 2017-04-22 NOTE — DISCHARGE NOTE ADULT - HOSPITAL COURSE
sent  in  for  bacteremia,  bd cs/,  with  coag  neg  staph,  seen by  id,  antibiotics  stopped,  /  parainfluenza  positive,  with  wheezing,  acute  brochospasm  resiolving,  mueloma,  h/o  dvt  on  coumadin,  f/o  onc, next  week

## 2017-04-22 NOTE — DISCHARGE NOTE ADULT - MEDICATION SUMMARY - MEDICATIONS TO STOP TAKING
I will STOP taking the medications listed below when I get home from the hospital:    enoxaparin 100 mg/mL injectable solution  -- 100 milligram(s) injectable every 12 hours MDD:200 mg  -- It is very important that you take or use this exactly as directed.  Do not skip doses or discontinue unless directed by your doctor.

## 2017-04-22 NOTE — DISCHARGE NOTE ADULT - MEDICATION SUMMARY - MEDICATIONS TO TAKE
I will START or STAY ON the medications listed below when I get home from the hospital:    predniSONE 10 mg oral tablet  -- 4 tab(s) by mouth once a day X 5 days; 3 tabs by mouth once a day X 5 days; 2 tabs X 5 days; 1 tab by mouth X 5 days then stop  -- It is very important that you take or use this exactly as directed.  Do not skip doses or discontinue unless directed by your doctor.  Obtain medical advice before taking any non-prescription drugs as some may affect the action of this medication.  Take with food or milk.    -- Indication: For COPD    aspirin 81 mg oral delayed release tablet  -- 1 tab(s) by mouth once a day  -- Indication: For preventive measure    Coumadin 5 mg oral tablet  -- 1 tab(s) by mouth once a day MDD:1 tab  -- Do not take this drug if you are pregnant.  It is very important that you take or use this exactly as directed.  Do not skip doses or discontinue unless directed by your doctor.  Obtain medical advice before taking any non-prescription drugs as some may affect the action of this medication.    -- Indication: For DVT (deep venous thrombosis)    glimepiride 2 mg oral tablet  -- 1 tab(s) by mouth once a day  -- Indication: For Diabetese    simvastatin 20 mg oral tablet  -- 1 tab(s) by mouth once a day (at bedtime)  -- Indication: For high cholesterol    Tessalon Perles 100 mg oral capsule  -- 1 cap(s) by mouth 3 times a day MDD:3  -- May cause drowsiness.  Alcohol may intensify this effect.  Use care when operating dangerous machinery.  Swallow whole.  Do not crush.    -- Indication: For cough    acyclovir 400 mg oral tablet  -- 1 tab(s) by mouth once a day  -- Indication: For rash    metoprolol tartrate 25 mg oral tablet  -- 1 tab(s) by mouth 2 times a day  -- Indication: For CAD    Advair Diskus 250 mcg-50 mcg inhalation powder  -- 1 puff(s) inhaled 2 times a day MDD:2  -- Check with your doctor before becoming pregnant.  For inhalation only.  Obtain medical advice before taking any non-prescription drugs as some may affect the action of this medication.  Rinse mouth thoroughly after use.    -- Indication: For COPD    docusate sodium 100 mg oral capsule  -- 1 cap(s) by mouth 3 times a day  -- Indication: For costipation    senna oral tablet  -- 2 tab(s) by mouth once a day (at bedtime)  -- Indication: For costipation    ocular lubricant ophthalmic solution  -- 1 drop(s) to each affected eye 4 times a day  -- Indication: For Dry eyes

## 2017-04-22 NOTE — DISCHARGE NOTE ADULT - PATIENT PORTAL LINK FT
“You can access the Follow[facility] Patient Portal, offered by [facility], by registering with the following website: [email or web address]”

## 2017-04-22 NOTE — DISCHARGE NOTE ADULT - PLAN OF CARE
resolved completed the course of antibiotics in the hospital.  patient was seen by infectious disease team in the hospital.  If your cough worsens, you develop fever greater than 101', you have shaking chills, a fast heartbeat, trouble breathing and/or feel your are breathing much faster than usual, call your healthcare provider.  Make sure you wash your hands frequently.  Patient was positive for parainfluenza .  continue with steroids and nebulizer treatment as advised. you were on pomalidomide.  it was on hold while in the hospital.   Return to your oncologist in 2-3 days to resume your medication. continue with daily coumadin based on PT/INR.  Take your "blood thinners" as prescribed.  Walking is encouraged, increase activity as tolerated.  If you develop new leg pain, swelling, and/or redness contact your healthcare provider.  If you develop new chest pain with difficulty breathing, a rapid heart rate and/or a feeling of passing out call emergency medical services 911.

## 2017-04-22 NOTE — DISCHARGE NOTE ADULT - CARE PLAN
Principal Discharge DX:	Bacteremia  Goal:	resolved  Instructions for follow-up, activity and diet:	completed the course of antibiotics in the hospital.  patient was seen by infectious disease team in the hospital.  If your cough worsens, you develop fever greater than 101', you have shaking chills, a fast heartbeat, trouble breathing and/or feel your are breathing much faster than usual, call your healthcare provider.  Make sure you wash your hands frequently.  Patient was positive for parainfluenza .  continue with steroids and nebulizer treatment as advised.  Secondary Diagnosis:	Multiple myeloma  Instructions for follow-up, activity and diet:	you were on pomalidomide.  it was on hold while in the hospital.   Return to your oncologist in 2-3 days to resume your medication.  Secondary Diagnosis:	DVT (deep venous thrombosis)  Instructions for follow-up, activity and diet:	continue with daily coumadin based on PT/INR.  Take your "blood thinners" as prescribed.  Walking is encouraged, increase activity as tolerated.  If you develop new leg pain, swelling, and/or redness contact your healthcare provider.  If you develop new chest pain with difficulty breathing, a rapid heart rate and/or a feeling of passing out call emergency medical services 911.

## 2017-04-23 VITALS
TEMPERATURE: 98 F | DIASTOLIC BLOOD PRESSURE: 82 MMHG | RESPIRATION RATE: 18 BRPM | SYSTOLIC BLOOD PRESSURE: 120 MMHG | HEART RATE: 66 BPM | OXYGEN SATURATION: 94 %

## 2017-04-23 LAB
INR BLD: 2.42 RATIO — HIGH (ref 0.88–1.16)
PROTHROM AB SERPL-ACNC: 27.9 SEC — HIGH (ref 10–13.1)

## 2017-04-23 PROCEDURE — 80048 BASIC METABOLIC PNL TOTAL CA: CPT

## 2017-04-23 PROCEDURE — 94640 AIRWAY INHALATION TREATMENT: CPT

## 2017-04-23 PROCEDURE — 83036 HEMOGLOBIN GLYCOSYLATED A1C: CPT

## 2017-04-23 PROCEDURE — 83605 ASSAY OF LACTIC ACID: CPT

## 2017-04-23 PROCEDURE — 82803 BLOOD GASES ANY COMBINATION: CPT

## 2017-04-23 PROCEDURE — 99285 EMERGENCY DEPT VISIT HI MDM: CPT | Mod: 25

## 2017-04-23 PROCEDURE — 85610 PROTHROMBIN TIME: CPT

## 2017-04-23 PROCEDURE — 85014 HEMATOCRIT: CPT

## 2017-04-23 PROCEDURE — 99284 EMERGENCY DEPT VISIT MOD MDM: CPT | Mod: 25

## 2017-04-23 PROCEDURE — 85730 THROMBOPLASTIN TIME PARTIAL: CPT

## 2017-04-23 PROCEDURE — 93005 ELECTROCARDIOGRAM TRACING: CPT

## 2017-04-23 PROCEDURE — 80202 ASSAY OF VANCOMYCIN: CPT

## 2017-04-23 PROCEDURE — 71046 X-RAY EXAM CHEST 2 VIEWS: CPT

## 2017-04-23 PROCEDURE — 87581 M.PNEUMON DNA AMP PROBE: CPT

## 2017-04-23 PROCEDURE — 82947 ASSAY GLUCOSE BLOOD QUANT: CPT

## 2017-04-23 PROCEDURE — 80053 COMPREHEN METABOLIC PANEL: CPT

## 2017-04-23 PROCEDURE — 84295 ASSAY OF SERUM SODIUM: CPT

## 2017-04-23 PROCEDURE — 87633 RESP VIRUS 12-25 TARGETS: CPT

## 2017-04-23 PROCEDURE — 84132 ASSAY OF SERUM POTASSIUM: CPT

## 2017-04-23 PROCEDURE — 82330 ASSAY OF CALCIUM: CPT

## 2017-04-23 PROCEDURE — 85652 RBC SED RATE AUTOMATED: CPT

## 2017-04-23 PROCEDURE — 82435 ASSAY OF BLOOD CHLORIDE: CPT

## 2017-04-23 PROCEDURE — 96374 THER/PROPH/DIAG INJ IV PUSH: CPT

## 2017-04-23 PROCEDURE — 86140 C-REACTIVE PROTEIN: CPT

## 2017-04-23 PROCEDURE — 87486 CHLMYD PNEUM DNA AMP PROBE: CPT

## 2017-04-23 PROCEDURE — 87040 BLOOD CULTURE FOR BACTERIA: CPT

## 2017-04-23 PROCEDURE — 87798 DETECT AGENT NOS DNA AMP: CPT

## 2017-04-23 PROCEDURE — 96375 TX/PRO/DX INJ NEW DRUG ADDON: CPT

## 2017-04-23 PROCEDURE — 85027 COMPLETE CBC AUTOMATED: CPT

## 2017-04-23 RX ORDER — FLUTICASONE PROPIONATE AND SALMETEROL 50; 250 UG/1; UG/1
1 POWDER ORAL; RESPIRATORY (INHALATION)
Qty: 60 | Refills: 0
Start: 2017-04-23 | End: 2017-05-23

## 2017-04-23 RX ORDER — WARFARIN SODIUM 2.5 MG/1
5 TABLET ORAL ONCE
Qty: 0 | Refills: 0 | Status: DISCONTINUED | OUTPATIENT
Start: 2017-04-23 | End: 2017-04-23

## 2017-04-23 RX ORDER — ZOLPIDEM TARTRATE 10 MG/1
1 TABLET ORAL
Qty: 10 | Refills: 0 | OUTPATIENT
Start: 2017-04-23 | End: 2017-05-03

## 2017-04-23 RX ORDER — TIOTROPIUM BROMIDE 18 UG/1
1 CAPSULE ORAL; RESPIRATORY (INHALATION)
Qty: 1 | Refills: 0 | OUTPATIENT
Start: 2017-04-23 | End: 2017-05-23

## 2017-04-23 RX ADMIN — SODIUM CHLORIDE 50 MILLILITER(S): 9 INJECTION INTRAMUSCULAR; INTRAVENOUS; SUBCUTANEOUS at 07:16

## 2017-04-23 RX ADMIN — Medication 1 DROP(S): at 06:42

## 2017-04-23 RX ADMIN — Medication 1: at 08:34

## 2017-04-23 RX ADMIN — Medication 25 MILLIGRAM(S): at 06:42

## 2017-04-23 RX ADMIN — Medication 20 MILLIGRAM(S): at 06:42

## 2017-04-24 LAB
CULTURE RESULTS: SIGNIFICANT CHANGE UP
CULTURE RESULTS: SIGNIFICANT CHANGE UP
SPECIMEN SOURCE: SIGNIFICANT CHANGE UP
SPECIMEN SOURCE: SIGNIFICANT CHANGE UP
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
## 2019-01-13 NOTE — DISCHARGE NOTE ADULT - NSFTFHOMEHTHYNRD_GEN_ALL_CORE
Pt presents to the Ed for CC of left posterior leg pain and a posterior head pain. Pt states he has a hx of sciatica and occipital neuralgia. Pt notes the leg pain has been occurring for 2 weeks and the head pain started today. Pt ambulatory with pain. Pt took muscle relaxers and Vicodin one one PTA.   
No

## 2021-06-18 NOTE — PATIENT PROFILE ADULT. - FUNCTIONAL LEVEL PRIOR: TOILETING
A1C, CMP and FLP orders placed. Are these appropriate? Any others you'd like added?   (0) independent

## 2021-07-23 ENCOUNTER — NON-APPOINTMENT (OUTPATIENT)
Age: 66
End: 2021-07-23

## 2021-08-10 ENCOUNTER — NON-APPOINTMENT (OUTPATIENT)
Age: 66
End: 2021-08-10

## 2021-08-10 ENCOUNTER — APPOINTMENT (OUTPATIENT)
Dept: CARDIOLOGY | Facility: CLINIC | Age: 66
End: 2021-08-10
Payer: MEDICARE

## 2021-08-10 VITALS
DIASTOLIC BLOOD PRESSURE: 60 MMHG | WEIGHT: 233 LBS | SYSTOLIC BLOOD PRESSURE: 120 MMHG | OXYGEN SATURATION: 98 % | HEART RATE: 81 BPM | HEIGHT: 68 IN | BODY MASS INDEX: 35.31 KG/M2 | TEMPERATURE: 97.1 F

## 2021-08-10 DIAGNOSIS — Z87.01 PERSONAL HISTORY OF PNEUMONIA (RECURRENT): ICD-10-CM

## 2021-08-10 DIAGNOSIS — Z78.9 OTHER SPECIFIED HEALTH STATUS: ICD-10-CM

## 2021-08-10 DIAGNOSIS — Z79.899 OTHER LONG TERM (CURRENT) DRUG THERAPY: ICD-10-CM

## 2021-08-10 DIAGNOSIS — Z94.84 STEM CELLS TRANSPLANT STATUS: ICD-10-CM

## 2021-08-10 DIAGNOSIS — Z86.718 PERSONAL HISTORY OF OTHER VENOUS THROMBOSIS AND EMBOLISM: ICD-10-CM

## 2021-08-10 DIAGNOSIS — R91.8 OTHER NONSPECIFIC ABNORMAL FINDING OF LUNG FIELD: ICD-10-CM

## 2021-08-10 DIAGNOSIS — G47.30 SLEEP APNEA, UNSPECIFIED: ICD-10-CM

## 2021-08-10 DIAGNOSIS — Z87.891 PERSONAL HISTORY OF NICOTINE DEPENDENCE: ICD-10-CM

## 2021-08-10 DIAGNOSIS — R06.02 SHORTNESS OF BREATH: ICD-10-CM

## 2021-08-10 PROCEDURE — 93000 ELECTROCARDIOGRAM COMPLETE: CPT

## 2021-08-10 PROCEDURE — 99205 OFFICE O/P NEW HI 60 MIN: CPT

## 2021-08-16 RX ORDER — ATORVASTATIN CALCIUM 10 MG/1
10 TABLET, FILM COATED ORAL DAILY
Qty: 90 | Refills: 3 | Status: ACTIVE | COMMUNITY

## 2021-08-16 RX ORDER — SITAGLIPTIN 50 MG/1
50 TABLET, FILM COATED ORAL DAILY
Refills: 0 | Status: ACTIVE | COMMUNITY

## 2021-08-16 RX ORDER — ACYCLOVIR 400 MG/1
400 TABLET ORAL DAILY
Refills: 0 | Status: ACTIVE | COMMUNITY

## 2021-08-16 RX ORDER — GLIMEPIRIDE 2 MG/1
2 TABLET ORAL DAILY
Refills: 0 | Status: ACTIVE | COMMUNITY

## 2021-08-17 ENCOUNTER — NON-APPOINTMENT (OUTPATIENT)
Age: 66
End: 2021-08-17

## 2021-08-25 ENCOUNTER — APPOINTMENT (OUTPATIENT)
Dept: CARDIOLOGY | Facility: CLINIC | Age: 66
End: 2021-08-25
Payer: MEDICARE

## 2021-08-25 PROCEDURE — 93306 TTE W/DOPPLER COMPLETE: CPT

## 2021-08-27 ENCOUNTER — APPOINTMENT (OUTPATIENT)
Dept: CARDIOLOGY | Facility: CLINIC | Age: 66
End: 2021-08-27
Payer: MEDICARE

## 2021-08-27 PROCEDURE — 93015 CV STRESS TEST SUPVJ I&R: CPT

## 2021-08-27 PROCEDURE — A9502: CPT

## 2021-08-27 PROCEDURE — 78452 HT MUSCLE IMAGE SPECT MULT: CPT

## 2021-09-14 ENCOUNTER — APPOINTMENT (OUTPATIENT)
Dept: CARDIOLOGY | Facility: CLINIC | Age: 66
End: 2021-09-14
Payer: MEDICARE

## 2021-09-14 VITALS
TEMPERATURE: 97.3 F | HEIGHT: 68 IN | HEART RATE: 67 BPM | SYSTOLIC BLOOD PRESSURE: 110 MMHG | BODY MASS INDEX: 36.37 KG/M2 | DIASTOLIC BLOOD PRESSURE: 70 MMHG | OXYGEN SATURATION: 97 % | WEIGHT: 240 LBS

## 2021-09-14 DIAGNOSIS — I83.90 ASYMPTOMATIC VARICOSE VEINS OF UNSPECIFIED LOWER EXTREMITY: ICD-10-CM

## 2021-09-14 PROCEDURE — 99214 OFFICE O/P EST MOD 30 MIN: CPT

## 2021-09-14 RX ORDER — DILTIAZEM HYDROCHLORIDE 120 MG/1
120 CAPSULE, COATED, EXTENDED RELEASE ORAL
Qty: 180 | Refills: 3 | Status: DISCONTINUED | COMMUNITY
End: 2021-09-14

## 2021-09-14 RX ORDER — METOPROLOL SUCCINATE 25 MG/1
25 TABLET, EXTENDED RELEASE ORAL
Qty: 90 | Refills: 1 | Status: DISCONTINUED | COMMUNITY
End: 2021-09-14

## 2021-09-14 RX ORDER — METFORMIN HYDROCHLORIDE 500 MG/1
500 TABLET, FILM COATED, EXTENDED RELEASE ORAL TWICE DAILY
Refills: 0 | Status: ACTIVE | COMMUNITY

## 2021-09-14 RX ORDER — CALCIUM CARBONATE 300MG(750)
1000 TABLET,CHEWABLE ORAL DAILY
Refills: 0 | Status: ACTIVE | COMMUNITY

## 2021-09-14 RX ORDER — DEXAMETHASONE 4 MG/1
4 TABLET ORAL
Refills: 0 | Status: ACTIVE | COMMUNITY

## 2021-09-14 RX ORDER — POMALIDOMIDE 4 MG/1
4 CAPSULE ORAL
Refills: 0 | Status: ACTIVE | COMMUNITY

## 2021-09-14 RX ORDER — CHROMIUM 200 MCG
25 MCG TABLET ORAL DAILY
Refills: 0 | Status: ACTIVE | COMMUNITY

## 2021-09-14 RX ORDER — WARFARIN 5 MG/1
5 TABLET ORAL AS DIRECTED
Refills: 3 | Status: DISCONTINUED | COMMUNITY
End: 2021-09-14

## 2021-09-14 RX ORDER — WARFARIN 7.5 MG/1
7.5 TABLET ORAL AS DIRECTED
Refills: 0 | Status: DISCONTINUED | COMMUNITY
Start: 2021-08-16 | End: 2021-09-14

## 2021-12-29 ENCOUNTER — RX RENEWAL (OUTPATIENT)
Age: 66
End: 2021-12-29

## 2022-01-11 ENCOUNTER — APPOINTMENT (OUTPATIENT)
Dept: CARDIOLOGY | Facility: CLINIC | Age: 67
End: 2022-01-11
Payer: MEDICARE

## 2022-01-11 PROCEDURE — 99442: CPT | Mod: 95

## 2022-06-27 ENCOUNTER — RX RENEWAL (OUTPATIENT)
Age: 67
End: 2022-06-27

## 2022-07-12 ENCOUNTER — NON-APPOINTMENT (OUTPATIENT)
Age: 67
End: 2022-07-12

## 2022-07-12 ENCOUNTER — APPOINTMENT (OUTPATIENT)
Dept: CARDIOLOGY | Facility: CLINIC | Age: 67
End: 2022-07-12

## 2022-07-12 VITALS
TEMPERATURE: 98.2 F | DIASTOLIC BLOOD PRESSURE: 72 MMHG | OXYGEN SATURATION: 96 % | HEIGHT: 68 IN | HEART RATE: 85 BPM | SYSTOLIC BLOOD PRESSURE: 136 MMHG | RESPIRATION RATE: 147 BRPM | WEIGHT: 246 LBS | BODY MASS INDEX: 37.28 KG/M2

## 2022-07-12 PROCEDURE — 93000 ELECTROCARDIOGRAM COMPLETE: CPT

## 2022-07-12 PROCEDURE — 99215 OFFICE O/P EST HI 40 MIN: CPT

## 2022-07-12 RX ORDER — AMOXICILLIN 500 MG/1
500 CAPSULE ORAL
Qty: 21 | Refills: 0 | Status: DISCONTINUED | COMMUNITY
Start: 2022-01-27

## 2022-07-12 RX ORDER — APIXABAN 5 MG/1
5 TABLET, FILM COATED ORAL
Qty: 60 | Refills: 3 | Status: ACTIVE | COMMUNITY

## 2022-07-12 RX ORDER — CHLORHEXIDINE GLUCONATE, 0.12% ORAL RINSE 1.2 MG/ML
0.12 SOLUTION DENTAL
Qty: 473 | Refills: 0 | Status: DISCONTINUED | COMMUNITY
Start: 2022-01-27

## 2023-07-11 ENCOUNTER — RX RENEWAL (OUTPATIENT)
Age: 68
End: 2023-07-11

## 2023-07-11 ENCOUNTER — APPOINTMENT (OUTPATIENT)
Dept: CARDIOLOGY | Facility: CLINIC | Age: 68
End: 2023-07-11
Payer: MEDICARE

## 2023-07-11 ENCOUNTER — NON-APPOINTMENT (OUTPATIENT)
Age: 68
End: 2023-07-11

## 2023-07-11 VITALS
DIASTOLIC BLOOD PRESSURE: 76 MMHG | OXYGEN SATURATION: 96 % | SYSTOLIC BLOOD PRESSURE: 122 MMHG | BODY MASS INDEX: 36.37 KG/M2 | HEART RATE: 87 BPM | WEIGHT: 240 LBS | TEMPERATURE: 97.8 F | HEIGHT: 68 IN

## 2023-07-11 DIAGNOSIS — I51.9 HEART DISEASE, UNSPECIFIED: ICD-10-CM

## 2023-07-11 PROCEDURE — 99214 OFFICE O/P EST MOD 30 MIN: CPT

## 2023-07-11 PROCEDURE — 93000 ELECTROCARDIOGRAM COMPLETE: CPT

## 2023-07-11 RX ORDER — TRIAMCINOLONE ACETONIDE 1 MG/G
0.1 CREAM TOPICAL
Qty: 60 | Refills: 0 | Status: DISCONTINUED | COMMUNITY
Start: 2021-05-03 | End: 2023-07-11

## 2023-07-21 ENCOUNTER — APPOINTMENT (OUTPATIENT)
Dept: CARDIOLOGY | Facility: CLINIC | Age: 68
End: 2023-07-21
Payer: MEDICARE

## 2023-07-21 PROCEDURE — 93306 TTE W/DOPPLER COMPLETE: CPT

## 2023-07-21 PROCEDURE — 76376 3D RENDER W/INTRP POSTPROCES: CPT

## 2023-08-14 PROBLEM — I51.9: Status: ACTIVE | Noted: 2023-08-14

## 2023-08-14 NOTE — ADDENDUM
[FreeTextEntry1] : Patient with apical thickening and RV hypokinesis. Symptoms of shortness of breath.  Cardiac MRI to assess.  Discussed with patient.  Cardiac MRI ordered.

## 2023-08-14 NOTE — HISTORY OF PRESENT ILLNESS
[FreeTextEntry1] : Patient with history of multiple myeloma and treated with oral medications. Now in remission. \par Has been followed with cardiology because of stem cell transplant 2013. \par Over the past year has been fatiguing quickly, shortness of breath, no chest pain. \par Had been coughing, urgent care and admitted to [facility] and was diagnosed with Pneumonia. Received IV abx, O2, 6 days. During hospitalization, noted to have afib. Now converted to NSR. TTe with EF 43%, CT of chest abd/pelvis without contrast. \par Sleep apnea- Treated with CPAP. \par DVT- 2017 ? of [facility] with a possible intervention. ? of PE. Has been on warfarin. \par Stress testing - 2 years prior. \par PFTs with Dr. Tinoco 2019. \par \par 9/2021- Diagnosed with left leg DVT. Started on eliquis. \par Underwent echo and stress test. Echo with normal EF. Stress with apical artifact. BNP normal. \par \par 7/2023- Limited in physical activity due to orthopedic limitations.

## 2023-08-14 NOTE — DISCUSSION/SUMMARY
[EKG obtained to assist in diagnosis and management of assessed problem(s)] : EKG obtained to assist in diagnosis and management of assessed problem(s) [FreeTextEntry1] : 1. Shortness of breath: ? of related to weight. Normal BNP, stress and echo. Possible artifact on stress test at the apex considering that no wall abnormality on echo. \par 2. DVT: ? of old vs new. Difficult to discern. Will continue eliquis. \par 3. Afib: on eliquis. No further events. AC for life. On beta blockers. \par 4. sleep apnea- stable. \par 5. Follow up in 1 year.

## 2023-08-30 ENCOUNTER — OUTPATIENT (OUTPATIENT)
Dept: OUTPATIENT SERVICES | Facility: HOSPITAL | Age: 68
LOS: 1 days | End: 2023-08-30

## 2023-08-30 ENCOUNTER — RESULT REVIEW (OUTPATIENT)
Age: 68
End: 2023-08-30

## 2023-08-30 ENCOUNTER — APPOINTMENT (OUTPATIENT)
Dept: MRI IMAGING | Facility: CLINIC | Age: 68
End: 2023-08-30
Payer: MEDICARE

## 2023-08-30 DIAGNOSIS — Z41.9 ENCOUNTER FOR PROCEDURE FOR PURPOSES OTHER THAN REMEDYING HEALTH STATE, UNSPECIFIED: Chronic | ICD-10-CM

## 2023-08-30 DIAGNOSIS — H26.40 UNSPECIFIED SECONDARY CATARACT: Chronic | ICD-10-CM

## 2023-08-30 DIAGNOSIS — I51.9 HEART DISEASE, UNSPECIFIED: ICD-10-CM

## 2023-08-30 PROCEDURE — 75565 CARD MRI VELOC FLOW MAPPING: CPT | Mod: 26,MH

## 2023-08-30 PROCEDURE — 75561 CARDIAC MRI FOR MORPH W/DYE: CPT | Mod: 26

## 2023-09-17 ENCOUNTER — NON-APPOINTMENT (OUTPATIENT)
Age: 68
End: 2023-09-17

## 2023-10-07 ENCOUNTER — NON-APPOINTMENT (OUTPATIENT)
Age: 68
End: 2023-10-07

## 2023-10-10 ENCOUNTER — APPOINTMENT (OUTPATIENT)
Dept: CARDIOLOGY | Facility: CLINIC | Age: 68
End: 2023-10-10
Payer: MEDICARE

## 2023-10-10 VITALS
BODY MASS INDEX: 36.37 KG/M2 | HEART RATE: 78 BPM | DIASTOLIC BLOOD PRESSURE: 76 MMHG | OXYGEN SATURATION: 96 % | TEMPERATURE: 97.2 F | WEIGHT: 240 LBS | HEIGHT: 68 IN | SYSTOLIC BLOOD PRESSURE: 128 MMHG

## 2023-10-10 PROCEDURE — 99214 OFFICE O/P EST MOD 30 MIN: CPT

## 2023-10-10 RX ORDER — IXAZOMIB 2.3 MG/1
2.3 CAPSULE ORAL
Refills: 0 | Status: DISCONTINUED | COMMUNITY
End: 2023-10-10

## 2023-10-24 ENCOUNTER — OUTPATIENT (OUTPATIENT)
Dept: OUTPATIENT SERVICES | Facility: HOSPITAL | Age: 68
LOS: 1 days | End: 2023-10-24
Payer: MEDICARE

## 2023-10-24 ENCOUNTER — TRANSCRIPTION ENCOUNTER (OUTPATIENT)
Age: 68
End: 2023-10-24

## 2023-10-24 VITALS
TEMPERATURE: 98 F | DIASTOLIC BLOOD PRESSURE: 70 MMHG | HEIGHT: 67 IN | WEIGHT: 233.25 LBS | SYSTOLIC BLOOD PRESSURE: 132 MMHG | HEART RATE: 72 BPM | OXYGEN SATURATION: 95 %

## 2023-10-24 DIAGNOSIS — Z41.9 ENCOUNTER FOR PROCEDURE FOR PURPOSES OTHER THAN REMEDYING HEALTH STATE, UNSPECIFIED: Chronic | ICD-10-CM

## 2023-10-24 DIAGNOSIS — H26.40 UNSPECIFIED SECONDARY CATARACT: Chronic | ICD-10-CM

## 2023-10-24 DIAGNOSIS — Z01.818 ENCOUNTER FOR OTHER PREPROCEDURAL EXAMINATION: ICD-10-CM

## 2023-10-24 LAB
A1C WITH ESTIMATED AVERAGE GLUCOSE RESULT: 10.6 % — HIGH (ref 4–5.6)
A1C WITH ESTIMATED AVERAGE GLUCOSE RESULT: 10.6 % — HIGH (ref 4–5.6)
ALBUMIN SERPL ELPH-MCNC: 4.1 G/DL — SIGNIFICANT CHANGE UP (ref 3.3–5.2)
ALBUMIN SERPL ELPH-MCNC: 4.1 G/DL — SIGNIFICANT CHANGE UP (ref 3.3–5.2)
ALP SERPL-CCNC: 82 U/L — SIGNIFICANT CHANGE UP (ref 40–120)
ALP SERPL-CCNC: 82 U/L — SIGNIFICANT CHANGE UP (ref 40–120)
ALT FLD-CCNC: 18 U/L — SIGNIFICANT CHANGE UP
ALT FLD-CCNC: 18 U/L — SIGNIFICANT CHANGE UP
ANION GAP SERPL CALC-SCNC: 12 MMOL/L — SIGNIFICANT CHANGE UP (ref 5–17)
ANION GAP SERPL CALC-SCNC: 12 MMOL/L — SIGNIFICANT CHANGE UP (ref 5–17)
APTT BLD: 31 SEC — SIGNIFICANT CHANGE UP (ref 24.5–35.6)
APTT BLD: 31 SEC — SIGNIFICANT CHANGE UP (ref 24.5–35.6)
AST SERPL-CCNC: 14 U/L — SIGNIFICANT CHANGE UP
AST SERPL-CCNC: 14 U/L — SIGNIFICANT CHANGE UP
BASOPHILS # BLD AUTO: 0 K/UL — SIGNIFICANT CHANGE UP (ref 0–0.2)
BASOPHILS # BLD AUTO: 0 K/UL — SIGNIFICANT CHANGE UP (ref 0–0.2)
BASOPHILS NFR BLD AUTO: 0 % — SIGNIFICANT CHANGE UP (ref 0–2)
BASOPHILS NFR BLD AUTO: 0 % — SIGNIFICANT CHANGE UP (ref 0–2)
BILIRUB SERPL-MCNC: 0.3 MG/DL — LOW (ref 0.4–2)
BILIRUB SERPL-MCNC: 0.3 MG/DL — LOW (ref 0.4–2)
BUN SERPL-MCNC: 20.9 MG/DL — HIGH (ref 8–20)
BUN SERPL-MCNC: 20.9 MG/DL — HIGH (ref 8–20)
BURR CELLS BLD QL SMEAR: PRESENT — SIGNIFICANT CHANGE UP
BURR CELLS BLD QL SMEAR: PRESENT — SIGNIFICANT CHANGE UP
CALCIUM SERPL-MCNC: 9.3 MG/DL — SIGNIFICANT CHANGE UP (ref 8.4–10.5)
CALCIUM SERPL-MCNC: 9.3 MG/DL — SIGNIFICANT CHANGE UP (ref 8.4–10.5)
CHLORIDE SERPL-SCNC: 101 MMOL/L — SIGNIFICANT CHANGE UP (ref 96–108)
CHLORIDE SERPL-SCNC: 101 MMOL/L — SIGNIFICANT CHANGE UP (ref 96–108)
CHOLEST SERPL-MCNC: 149 MG/DL — SIGNIFICANT CHANGE UP
CHOLEST SERPL-MCNC: 149 MG/DL — SIGNIFICANT CHANGE UP
CO2 SERPL-SCNC: 26 MMOL/L — SIGNIFICANT CHANGE UP (ref 22–29)
CO2 SERPL-SCNC: 26 MMOL/L — SIGNIFICANT CHANGE UP (ref 22–29)
CREAT SERPL-MCNC: 0.8 MG/DL — SIGNIFICANT CHANGE UP (ref 0.5–1.3)
CREAT SERPL-MCNC: 0.8 MG/DL — SIGNIFICANT CHANGE UP (ref 0.5–1.3)
EGFR: 98 ML/MIN/1.73M2 — SIGNIFICANT CHANGE UP
EGFR: 98 ML/MIN/1.73M2 — SIGNIFICANT CHANGE UP
EOSINOPHIL # BLD AUTO: 0 K/UL — SIGNIFICANT CHANGE UP (ref 0–0.5)
EOSINOPHIL # BLD AUTO: 0 K/UL — SIGNIFICANT CHANGE UP (ref 0–0.5)
EOSINOPHIL NFR BLD AUTO: 0 % — SIGNIFICANT CHANGE UP (ref 0–6)
EOSINOPHIL NFR BLD AUTO: 0 % — SIGNIFICANT CHANGE UP (ref 0–6)
ESTIMATED AVERAGE GLUCOSE: 258 MG/DL — HIGH (ref 68–114)
ESTIMATED AVERAGE GLUCOSE: 258 MG/DL — HIGH (ref 68–114)
GIANT PLATELETS BLD QL SMEAR: PRESENT — SIGNIFICANT CHANGE UP
GIANT PLATELETS BLD QL SMEAR: PRESENT — SIGNIFICANT CHANGE UP
GLUCOSE SERPL-MCNC: 258 MG/DL — HIGH (ref 70–99)
GLUCOSE SERPL-MCNC: 258 MG/DL — HIGH (ref 70–99)
HCT VFR BLD CALC: 43.1 % — SIGNIFICANT CHANGE UP (ref 39–50)
HCT VFR BLD CALC: 43.1 % — SIGNIFICANT CHANGE UP (ref 39–50)
HDLC SERPL-MCNC: 50 MG/DL — SIGNIFICANT CHANGE UP
HDLC SERPL-MCNC: 50 MG/DL — SIGNIFICANT CHANGE UP
HGB BLD-MCNC: 14 G/DL — SIGNIFICANT CHANGE UP (ref 13–17)
HGB BLD-MCNC: 14 G/DL — SIGNIFICANT CHANGE UP (ref 13–17)
INR BLD: 1.1 RATIO — SIGNIFICANT CHANGE UP (ref 0.85–1.18)
INR BLD: 1.1 RATIO — SIGNIFICANT CHANGE UP (ref 0.85–1.18)
LIPID PNL WITH DIRECT LDL SERPL: 73 MG/DL — SIGNIFICANT CHANGE UP
LIPID PNL WITH DIRECT LDL SERPL: 73 MG/DL — SIGNIFICANT CHANGE UP
LYMPHOCYTES # BLD AUTO: 0.42 K/UL — LOW (ref 1–3.3)
LYMPHOCYTES # BLD AUTO: 0.42 K/UL — LOW (ref 1–3.3)
LYMPHOCYTES # BLD AUTO: 9.6 % — LOW (ref 13–44)
LYMPHOCYTES # BLD AUTO: 9.6 % — LOW (ref 13–44)
MAGNESIUM SERPL-MCNC: 1.9 MG/DL — SIGNIFICANT CHANGE UP (ref 1.8–2.6)
MAGNESIUM SERPL-MCNC: 1.9 MG/DL — SIGNIFICANT CHANGE UP (ref 1.8–2.6)
MANUAL SMEAR VERIFICATION: SIGNIFICANT CHANGE UP
MANUAL SMEAR VERIFICATION: SIGNIFICANT CHANGE UP
MCHC RBC-ENTMCNC: 32.5 GM/DL — SIGNIFICANT CHANGE UP (ref 32–36)
MCHC RBC-ENTMCNC: 32.5 GM/DL — SIGNIFICANT CHANGE UP (ref 32–36)
MCHC RBC-ENTMCNC: 32.8 PG — SIGNIFICANT CHANGE UP (ref 27–34)
MCHC RBC-ENTMCNC: 32.8 PG — SIGNIFICANT CHANGE UP (ref 27–34)
MCV RBC AUTO: 100.9 FL — HIGH (ref 80–100)
MCV RBC AUTO: 100.9 FL — HIGH (ref 80–100)
METAMYELOCYTES # FLD: 0.9 % — HIGH (ref 0–0)
METAMYELOCYTES # FLD: 0.9 % — HIGH (ref 0–0)
MONOCYTES # BLD AUTO: 1.32 K/UL — HIGH (ref 0–0.9)
MONOCYTES # BLD AUTO: 1.32 K/UL — HIGH (ref 0–0.9)
MONOCYTES NFR BLD AUTO: 29.8 % — HIGH (ref 2–14)
MONOCYTES NFR BLD AUTO: 29.8 % — HIGH (ref 2–14)
NEUTROPHILS # BLD AUTO: 2.64 K/UL — SIGNIFICANT CHANGE UP (ref 1.8–7.4)
NEUTROPHILS # BLD AUTO: 2.64 K/UL — SIGNIFICANT CHANGE UP (ref 1.8–7.4)
NEUTROPHILS NFR BLD AUTO: 59.7 % — SIGNIFICANT CHANGE UP (ref 43–77)
NEUTROPHILS NFR BLD AUTO: 59.7 % — SIGNIFICANT CHANGE UP (ref 43–77)
NON HDL CHOLESTEROL: 99 MG/DL — SIGNIFICANT CHANGE UP
NON HDL CHOLESTEROL: 99 MG/DL — SIGNIFICANT CHANGE UP
OVALOCYTES BLD QL SMEAR: SLIGHT — SIGNIFICANT CHANGE UP
OVALOCYTES BLD QL SMEAR: SLIGHT — SIGNIFICANT CHANGE UP
PLAT MORPH BLD: NORMAL — SIGNIFICANT CHANGE UP
PLAT MORPH BLD: NORMAL — SIGNIFICANT CHANGE UP
PLATELET # BLD AUTO: 278 K/UL — SIGNIFICANT CHANGE UP (ref 150–400)
PLATELET # BLD AUTO: 278 K/UL — SIGNIFICANT CHANGE UP (ref 150–400)
POIKILOCYTOSIS BLD QL AUTO: SLIGHT — SIGNIFICANT CHANGE UP
POIKILOCYTOSIS BLD QL AUTO: SLIGHT — SIGNIFICANT CHANGE UP
POLYCHROMASIA BLD QL SMEAR: SLIGHT — SIGNIFICANT CHANGE UP
POLYCHROMASIA BLD QL SMEAR: SLIGHT — SIGNIFICANT CHANGE UP
POTASSIUM SERPL-MCNC: 4.5 MMOL/L — SIGNIFICANT CHANGE UP (ref 3.5–5.3)
POTASSIUM SERPL-MCNC: 4.5 MMOL/L — SIGNIFICANT CHANGE UP (ref 3.5–5.3)
POTASSIUM SERPL-SCNC: 4.5 MMOL/L — SIGNIFICANT CHANGE UP (ref 3.5–5.3)
POTASSIUM SERPL-SCNC: 4.5 MMOL/L — SIGNIFICANT CHANGE UP (ref 3.5–5.3)
PROT SERPL-MCNC: 7 G/DL — SIGNIFICANT CHANGE UP (ref 6.6–8.7)
PROT SERPL-MCNC: 7 G/DL — SIGNIFICANT CHANGE UP (ref 6.6–8.7)
PROTHROM AB SERPL-ACNC: 12.2 SEC — SIGNIFICANT CHANGE UP (ref 9.5–13)
PROTHROM AB SERPL-ACNC: 12.2 SEC — SIGNIFICANT CHANGE UP (ref 9.5–13)
RBC # BLD: 4.27 M/UL — SIGNIFICANT CHANGE UP (ref 4.2–5.8)
RBC # BLD: 4.27 M/UL — SIGNIFICANT CHANGE UP (ref 4.2–5.8)
RBC # FLD: 14.6 % — HIGH (ref 10.3–14.5)
RBC # FLD: 14.6 % — HIGH (ref 10.3–14.5)
RBC BLD AUTO: ABNORMAL
RBC BLD AUTO: ABNORMAL
SODIUM SERPL-SCNC: 139 MMOL/L — SIGNIFICANT CHANGE UP (ref 135–145)
SODIUM SERPL-SCNC: 139 MMOL/L — SIGNIFICANT CHANGE UP (ref 135–145)
TRIGL SERPL-MCNC: 132 MG/DL — SIGNIFICANT CHANGE UP
TRIGL SERPL-MCNC: 132 MG/DL — SIGNIFICANT CHANGE UP
WBC # BLD: 4.42 K/UL — SIGNIFICANT CHANGE UP (ref 3.8–10.5)
WBC # BLD: 4.42 K/UL — SIGNIFICANT CHANGE UP (ref 3.8–10.5)
WBC # FLD AUTO: 4.42 K/UL — SIGNIFICANT CHANGE UP (ref 3.8–10.5)
WBC # FLD AUTO: 4.42 K/UL — SIGNIFICANT CHANGE UP (ref 3.8–10.5)

## 2023-10-24 PROCEDURE — 93010 ELECTROCARDIOGRAM REPORT: CPT

## 2023-10-24 PROCEDURE — 93005 ELECTROCARDIOGRAM TRACING: CPT

## 2023-10-24 PROCEDURE — G0463: CPT

## 2023-10-24 NOTE — H&P PST ADULT - NSICDXPASTMEDICALHX_GEN_ALL_CORE_FT
PAST MEDICAL HISTORY:  DM (diabetes mellitus)     HTN (hypertension)     Hypercholesteremia     Multiple myeloma     Vitamin D deficiency

## 2023-10-24 NOTE — H&P PST ADULT - NSICDXPASTSURGICALHX_GEN_ALL_CORE_FT
PAST SURGICAL HISTORY:  After cataract left eye    Elective surgery Back surgery. Kyphoplasty    Elective surgery Stem cell transplant 2013.

## 2023-10-24 NOTE — H&P PST ADULT - HISTORY OF PRESENT ILLNESS
Department of Cardiology                                                                  [facility]/[place] [address]                                                            Telephone: [phone]. Fax:[phone]                                                                                    [facility]     Chief complaint:    Patient is a 68y old  Male who presents with a chief complaint of NDIAYE    HPI: Patient with PMH of HLD, HTN, DM, PAF,  and multiple myeloma and treated with oral medications. Now in remission. Has been followed with cardiology because of stem cell transplant 2013. TTE performed on 7/11/2023 showed RV dysfunction and hypokenisis, EF 50-55%. He has c/o NDIAYE and increasing fatigue, decreased activity intolerance. Cardiac MRI was completed which was revealed an EF 38%, some concerns for possible sarcoidosis, now presents to presurgical testing for LHC on 10/30/2023.  He denies prior MI, CVA, PCI, VHD, syncope     Sleep apnea- Treated with CPAP.  Stress testing - 2 years prior.  PFTs with Dr. Tinoco 2019.  9/2021- Diagnosed with left leg DVT. Started on eliquis.  Underwent echo and stress test. Echo with normal EF. Stress with apical artifact. BNP normal.    10/2023- Cardiac MRI with EF 38%. Plan for cardiac cath   NON-CARDIAC FINDINGS:  There is no evidence of mediastinal or cardiac mass. Evaluation of the lungs demonstrates no abnormal pleural or parenchymal signal. Remaining osseous and soft tissue structures are grossly unremarkable.  IMPRESSION:  1. Normal-sized left ventricle with moderately depressed systolic function. Left ventricular ejection fraction of 38%.  2. Normal-sized right ventricle with mildly depressed systolic function. Right ventricular ejection fraction of 30%.  3.   Focal patchy abnormal late gadolinium enhancement is seen in lateral wall of left cardiac base which in chronic setting may suggest an infiltrative process such as sarcoidosis.    Heart Failure:        Systolic/Diastolic/Combined:        NYHA Class (within 2 weeks):     Echo (Date, Findings): 7/11/2023  CONCLUSIONS:    1. The left ventricular systolic function is normal with an ejection fraction of 55 %.  2. Frequent ectopy noted throughout with runs of bigeminy.  3. There is normal left ventricular diastolic function.  4. RV apex appears hypokinetic.  5. Mild mitral regurgitation.  6. Echogenic structure noted in the LV apex; prior study stated possible trabeculations vs. false tendon. Recommend contrast study to try to further characterize.  7. Mild left ventricular hypertrophy.  8. Mild tricuspid regurgitation.  9. Compared to the transthoracic echocardiogram performed on 8/25/2021 decrease in ascending aorta dimension obtained. RV apex appears hypokinetic.      Antiarrhythmic Therapies:      Anticoagulation Therapies:    Antianginal Therapies:        Beta Blockers:         Calcium Channel Blockers:        Long Acting Nitrates:        Ranexa:     Associated Risk Factors:        Cerebrovascular Disease: N/A       Chronic Lung Disease: N/A       Peripheral Arterial Disease: N/A       Chronic Kidney Disease (if yes, what is GFR): N/A       Uncontrolled Diabetes (if yes, what is HgbA1C or FBS): N/A       Poorly Controlled Hypertension (if yes, what is SBP): N/A       Morbid Obesity (if yes, what is BMI): N/A       History of Recent Ventricular Arrhythmia: N/A       Inability to Ambulate Safely: N/A       Need for Therapeutic Anticoagulation: N/A       Antiplatelet or Contrast Allergy: N/A    MEDICATIONS:                  ROS: as stated above, otherwise negative    PHYSICAL EXAM:  Constitutional: A & O x 3  HEENT:   Normal oral mucosa, PERRL, EOMI	  Cardiovascular: Normal S1 S2, No JVD, *****No murmurs  Respiratory: Lungs clear to auscultation	****  Gastrointestinal:  Soft, Non-tender, + BS	  Skin: No rashes, No ecchymoses, No cyanosis  Neurologic: Non-focal  Extremities: Normal range of motion, no edema****  Vascular: Peripheral pulses palpable + bilaterally ****    ECG:  	    LABS:	 	                        HPI:      Plan/Recommendations:   -plan for **** on ***  -patient seen and examined  -ECG and Labs reviewed  -NPO after midnight prior with exception of sip of water with morning medications  -Continue/Hold antiarrhythmics ................................  -Hold ***  -START ****  -Hold Jardiance, Farxiga, Synjardy, Steglatro, Xigduo XR, Irvokana for 4  days pre procedure  - Gold GLP-1 medication 1 week prior if on oral hold the day of.  -Coumadin may be continued as long as inr is 2-3 and check the am of procedure.   -Pre-procedure instructions provided (verbal & written)   -Supplies and verbal/written Instructions for pre-surgical chlorhexadine shower provided*****  -Consent to be obtained by attending electrophysiologist on the scheduled procedure date  - Watchman implant Hold DOAC/NOAC 48 hrs prior to date of procedure.   -PPM hold DOAC/NOAC 48 hrs prior to implant.   - Afib Ablation ok to do on theraputic coumadin   -Afib ablation Last dose of DOAC ( eliquis, xarelto,  Pradaxa, lixiana ,edoxaban) the evening before procedure      MALLAMPATI:  ASA:  BRA:  GFR:                                                                                 Department of Cardiology                                                                  [facility]/[place] [address] [address]                                                            Telephone: [phone]. Fax:[phone]                                                                                    [facility]     Chief complaint:    Patient is a 68y old  Male who presents with a chief complaint of NDIAYE    HPI: Patient with PMH of HLD, HTN, DM, PAF,  and multiple myeloma and treated with oral medications. Now in remission. Has been followed with cardiology because of stem cell transplant . TTE performed on 2023 showed RV dysfunction and hypokenisis, EF 50-55%. He has c/o NDIAYE and increasing fatigue, decreased activity intolerance. Cardiac MRI was completed which was revealed an EF 38%, some concerns for possible sarcoidosis, now presents to presurgical testing for LHC on 10/30/2023.  He denies prior MI, CVA, PCI, VHD, syncope     Sleep apnea- Treated with CPAP.  Stress testing - 2 years prior.  PFTs with Dr. Tinoco .  2021- Diagnosed with left leg DVT. Started on eliquis.  Underwent echo and stress test. Echo with normal EF. Stress with apical artifact. BNP normal.    10/2023- Cardiac MRI with EF 38%. Plan for cardiac cath   NON-CARDIAC FINDINGS:  There is no evidence of mediastinal or cardiac mass. Evaluation of the lungs demonstrates no abnormal pleural or parenchymal signal. Remaining osseous and soft tissue structures are grossly unremarkable.  IMPRESSION:  1. Normal-sized left ventricle with moderately depressed systolic function. Left ventricular ejection fraction of 38%.  2. Normal-sized right ventricle with mildly depressed systolic function. Right ventricular ejection fraction of 30%.  3.   Focal patchy abnormal late gadolinium enhancement is seen in lateral wall of left cardiac base which in chronic setting may suggest an infiltrative process such as sarcoidosis.    Heart Failure:        Systolic/Diastolic/Combined: RV/LV dysfunction on Cardiac MRI       NYHA Class (within 2 weeks): II    Echo (Date, Findings): 2023  CONCLUSIONS:    1. The left ventricular systolic function is normal with an ejection fraction of 55 %.  2. Frequent ectopy noted throughout with runs of bigeminy.  3. There is normal left ventricular diastolic function.  4. RV apex appears hypokinetic.  5. Mild mitral regurgitation.  6. Echogenic structure noted in the LV apex; prior study stated possible trabeculations vs. false tendon. Recommend contrast study to try to further characterize.  7. Mild left ventricular hypertrophy.  8. Mild tricuspid regurgitation.  9. Compared to the transthoracic echocardiogram performed on 2021 decrease in ascending aorta dimension obtained. RV apex appears hypokinetic.        Anticoagulation Therapies:  on Eliquis   Antianginal Therapies:        Beta Blockers:  metoprolol        Calcium Channel Blockers:        Long Acting Nitrates:        Ranexa:     Associated Risk Factors:        Cerebrovascular Disease: N/A       Chronic Lung Disease: N/A       Peripheral Arterial Disease: N/A       Chronic Kidney Disease (if yes, what is GFR): N/A       Uncontrolled Diabetes (if yes, what is HgbA1C or FBS): YES        Poorly Controlled Hypertension (if yes, what is SBP): N/A       Morbid Obesity (if yes, what is BMI): YES 36.5       History of Recent Ventricular Arrhythmia: N/A       Inability to Ambulate Safely: N/A       Need for Therapeutic Anticoagulation: YES DVT       Antiplatelet or Contrast Allergy: N/A    MEDICATIONS:  Home Medications:  acyclovir 400 mg oral tablet: 1 tab(s) orally once a day (24 Oct 2023 13:52)  aspirin 81 mg oral delayed release tablet: 1 tab(s) orally once a day (24 Oct 2023 13:52)  dexAMETHasone 4 mg oral tablet: 1 tab(s) orally Take 5 pills by mouth once a week (24 Oct 2023 13:51)  Eliquis 5 mg oral tablet: 1 tab(s) orally 2 times a day (24 Oct 2023 13:51)  glimepiride 2 mg oral tablet: 1 tab(s) orally once a day (24 Oct 2023 13:52)  Januvia 50 mg oral tablet: 1 tab(s) orally once a day (24 Oct 2023 13:51)  Lipitor 10 mg oral tablet: 1 tab(s) orally once a day (at bedtime) (24 Oct 2023 13:51)  metFORMIN 500 mg oral tablet: 1 tab(s) orally 2 times a day (24 Oct 2023 13:51)  metoprolol tartrate 25 mg oral tablet: 1 tab(s) orally 2 times a day (24 Oct 2023 13:52)  ocular lubricant ophthalmic solution: 1 drop(s) to each affected eye 4 times a day (24 Oct 2023 13:52)  Pomalyst 4 mg oral capsule: 1 cap(s) orally once a day (24 Oct 2023 13:48)          ROS: as stated above, otherwise negative    PHYSICAL EXAM:  Constitutional: A & O x 3  HEENT:   Normal oral mucosa, PERRL, EOMI	  Cardiovascular: Normal S1 S2, No JVD, No murmurs  Respiratory: Lungs clear to auscultation	  Gastrointestinal:  Soft, Non-tender, + BS, obese   Skin: No rashes, No ecchymoses, No cyanosis  Neurologic: Non-focal  Extremities: Normal range of motion, no edema  Vascular: Peripheral pulses palpable + bilaterally     ECbpm SR     LABS:	 	                                                                                               Department of Cardiology                                                                  [facility]/[place] [address] [address]                                                            Telephone: [phone]. Fax:[phone]                                                                                    [facility]     Chief complaint:    Patient is a 68y old  Male who presents with a chief complaint of NDIAYE    HPI: Patient with PMH of HLD, HTN, DM, PAF,  and multiple myeloma and treated with oral medications. Now in remission. Has been followed with cardiology because of stem cell transplant . TTE performed on 2023 showed RV dysfunction and hypokenisis, EF 50-55%. He has c/o NDIAYE and increasing fatigue, decreased activity intolerance. Cardiac MRI was completed which was revealed an EF 38%, some concerns for possible sarcoidosis, now presents to presurgical testing for LHC on 10/30/2023.  He denies prior MI, CVA, PCI, VHD, syncope     Sleep apnea- Treated with CPAP.  Stress testing - 2 years prior.  PFTs with Dr. Tinoco .  2021- Diagnosed with left leg DVT. Started on eliquis.  Underwent echo and stress test. Echo with normal EF. Stress with apical artifact. BNP normal.    10/2023- Cardiac MRI with EF 38%. Plan for cardiac cath   NON-CARDIAC FINDINGS:  There is no evidence of mediastinal or cardiac mass. Evaluation of the lungs demonstrates no abnormal pleural or parenchymal signal. Remaining osseous and soft tissue structures are grossly unremarkable.  IMPRESSION:  1. Normal-sized left ventricle with moderately depressed systolic function. Left ventricular ejection fraction of 38%.  2. Normal-sized right ventricle with mildly depressed systolic function. Right ventricular ejection fraction of 30%.  3.   Focal patchy abnormal late gadolinium enhancement is seen in lateral wall of left cardiac base which in chronic setting may suggest an infiltrative process such as sarcoidosis.    Heart Failure:        Systolic/Diastolic/Combined: RV/LV dysfunction on Cardiac MRI       NYHA Class (within 2 weeks): II    Echo (Date, Findings): 2023  CONCLUSIONS:    1. The left ventricular systolic function is normal with an ejection fraction of 55 %.  2. Frequent ectopy noted throughout with runs of bigeminy.  3. There is normal left ventricular diastolic function.  4. RV apex appears hypokinetic.  5. Mild mitral regurgitation.  6. Echogenic structure noted in the LV apex; prior study stated possible trabeculations vs. false tendon. Recommend contrast study to try to further characterize.  7. Mild left ventricular hypertrophy.  8. Mild tricuspid regurgitation.  9. Compared to the transthoracic echocardiogram performed on 2021 decrease in ascending aorta dimension obtained. RV apex appears hypokinetic.        Anticoagulation Therapies:  on Eliquis   Antianginal Therapies:        Beta Blockers:  metoprolol        Calcium Channel Blockers:        Long Acting Nitrates:        Ranexa:     Associated Risk Factors:        Cerebrovascular Disease: N/A       Chronic Lung Disease: N/A       Peripheral Arterial Disease: N/A       Chronic Kidney Disease (if yes, what is GFR): N/A       Uncontrolled Diabetes (if yes, what is HgbA1C or FBS): YES 10.6       Poorly Controlled Hypertension (if yes, what is SBP): N/A       Morbid Obesity (if yes, what is BMI): YES 36.5       History of Recent Ventricular Arrhythmia: N/A       Inability to Ambulate Safely: N/A       Need for Therapeutic Anticoagulation: YES DVT       Antiplatelet or Contrast Allergy: N/A    MEDICATIONS:  Home Medications:  acyclovir 400 mg oral tablet: 1 tab(s) orally once a day (24 Oct 2023 13:52)  aspirin 81 mg oral delayed release tablet: 1 tab(s) orally once a day (24 Oct 2023 13:52)  dexAMETHasone 4 mg oral tablet: 1 tab(s) orally Take 5 pills by mouth once a week (24 Oct 2023 13:51)  Eliquis 5 mg oral tablet: 1 tab(s) orally 2 times a day (24 Oct 2023 13:51)  glimepiride 2 mg oral tablet: 1 tab(s) orally once a day (24 Oct 2023 13:52)  Januvia 50 mg oral tablet: 1 tab(s) orally once a day (24 Oct 2023 13:51)  Lipitor 10 mg oral tablet: 1 tab(s) orally once a day (at bedtime) (24 Oct 2023 13:51)  metFORMIN 500 mg oral tablet: 1 tab(s) orally 2 times a day (24 Oct 2023 13:51)  metoprolol tartrate 25 mg oral tablet: 1 tab(s) orally 2 times a day (24 Oct 2023 13:52)  ocular lubricant ophthalmic solution: 1 drop(s) to each affected eye 4 times a day (24 Oct 2023 13:52)  Pomalyst 4 mg oral capsule: 1 cap(s) orally once a day (24 Oct 2023 13:48)      ROS: as stated above, otherwise negative    PHYSICAL EXAM:  Constitutional: A & O x 3  HEENT:   Normal oral mucosa, PERRL, EOMI	  Cardiovascular: Normal S1 S2, No JVD, No murmurs  Respiratory: Lungs clear to auscultation	  Gastrointestinal:  Soft, Non-tender, + BS, obese   Skin: No rashes, No ecchymoses, No cyanosis  Neurologic: Non-focal  Extremities: Normal range of motion, no edema  Vascular: Peripheral pulses palpable + bilaterally     ECbpm SR     LABS:	 	                          14.0   4.42  )-----------( 278      ( 24 Oct 2023 13:10 )             43.1   10-    139  |  101  |  20.9<H>  ----------------------------<  258<H>  4.5   |  26.0  |  0.80    Ca    9.3      24 Oct 2023 13:10  Mg     1.9     10-24    TPro  7.0  /  Alb  4.1  /  TBili  0.3<L>  /  DBili  x   /  AST  14  /  ALT  18  /  AlkPhos  82  10-24     A1C with Estimated Average Glucose Result: 10.6 % (10.24. @ 13:10)

## 2023-10-24 NOTE — H&P PST ADULT - ASSESSMENT
68 year old male with multiple CV risk factors and abnormal cardiac MR, reduced EF     Plan/Recommendations:   -plan for [facility] with Dr. Lancaster   -patient seen and examined  -ECG and Labs reviewed  -NPO after midnight prior with exception of sip of water with morning medications  -Hold Elquis x 48 hours, last dose 10/27/2023  -Hold Metformin night before procedure, last dose 10/29/2023 am   -Hold all antidiabetic medications day of procedure   -Pre-procedure instructions provided (verbal & written)   -Aspirin 81mg po pre procedure     MALLAMPATI: 3  ASA: 3  BRA:  GFR:  Creatinine  68 year old male with multiple CV risk factors and abnormal cardiac MR, reduced EF     Plan/Recommendations:   -plan for [facility] with Dr. Lancaster   -patient seen and examined  -ECG and Labs reviewed  -NPO after midnight prior with exception of sip of water with morning medications  -Hold Elquis x 48 hours, last dose 10/27/2023  -Hold Metformin night before procedure, last dose 10/29/2023 am   -Hold all antidiabetic medications day of procedure   -Pre-procedure instructions provided (verbal & written)   -Aspirin 81mg po pre procedure     MALLAMPATI: 3  ASA: 3  BRA: 0.8  GFR: 98  Creatinine: 0.80

## 2023-10-28 RX ORDER — CHLORHEXIDINE GLUCONATE 213 G/1000ML
1 SOLUTION TOPICAL ONCE
Refills: 0 | Status: DISCONTINUED | OUTPATIENT
Start: 2023-10-30 | End: 2023-11-13

## 2023-10-30 ENCOUNTER — OUTPATIENT (OUTPATIENT)
Dept: OUTPATIENT SERVICES | Facility: HOSPITAL | Age: 68
LOS: 1 days | Discharge: HOME | End: 2023-10-30
Payer: MEDICARE

## 2023-10-30 ENCOUNTER — TRANSCRIPTION ENCOUNTER (OUTPATIENT)
Age: 68
End: 2023-10-30

## 2023-10-30 VITALS
SYSTOLIC BLOOD PRESSURE: 117 MMHG | OXYGEN SATURATION: 95 % | DIASTOLIC BLOOD PRESSURE: 68 MMHG | HEART RATE: 78 BPM | RESPIRATION RATE: 16 BRPM

## 2023-10-30 VITALS
OXYGEN SATURATION: 95 % | RESPIRATION RATE: 16 BRPM | SYSTOLIC BLOOD PRESSURE: 129 MMHG | HEART RATE: 86 BPM | WEIGHT: 233.03 LBS | DIASTOLIC BLOOD PRESSURE: 81 MMHG | TEMPERATURE: 98 F | HEIGHT: 67 IN

## 2023-10-30 DIAGNOSIS — H26.40 UNSPECIFIED SECONDARY CATARACT: Chronic | ICD-10-CM

## 2023-10-30 DIAGNOSIS — I42 CARDIOMYOPATHY: ICD-10-CM

## 2023-10-30 DIAGNOSIS — Z41.9 ENCOUNTER FOR PROCEDURE FOR PURPOSES OTHER THAN REMEDYING HEALTH STATE, UNSPECIFIED: Chronic | ICD-10-CM

## 2023-10-30 LAB
GLUCOSE BLDC GLUCOMTR-MCNC: 277 MG/DL — HIGH (ref 70–99)
GLUCOSE BLDC GLUCOMTR-MCNC: 277 MG/DL — HIGH (ref 70–99)

## 2023-10-30 PROCEDURE — 93458 L HRT ARTERY/VENTRICLE ANGIO: CPT

## 2023-10-30 PROCEDURE — 93458 L HRT ARTERY/VENTRICLE ANGIO: CPT | Mod: 26

## 2023-10-30 PROCEDURE — C1769: CPT

## 2023-10-30 PROCEDURE — C1894: CPT

## 2023-10-30 PROCEDURE — 99152 MOD SED SAME PHYS/QHP 5/>YRS: CPT

## 2023-10-30 PROCEDURE — 82962 GLUCOSE BLOOD TEST: CPT

## 2023-10-30 PROCEDURE — C1887: CPT

## 2023-10-30 RX ORDER — SITAGLIPTIN 50 MG/1
1 TABLET, FILM COATED ORAL
Refills: 0 | DISCHARGE

## 2023-10-30 RX ORDER — METFORMIN HYDROCHLORIDE 850 MG/1
1 TABLET ORAL
Qty: 0 | Refills: 0 | DISCHARGE

## 2023-10-30 RX ORDER — POMALIDOMIDE 1 MG/1
1 CAPSULE ORAL
Refills: 0 | DISCHARGE

## 2023-10-30 RX ORDER — DEXAMETHASONE 0.5 MG/5ML
1 ELIXIR ORAL
Refills: 0 | DISCHARGE

## 2023-10-30 RX ORDER — CARVEDILOL PHOSPHATE 80 MG/1
3.12 CAPSULE, EXTENDED RELEASE ORAL EVERY 12 HOURS
Refills: 0 | Status: DISCONTINUED | OUTPATIENT
Start: 2023-10-30 | End: 2023-11-13

## 2023-10-30 RX ORDER — SODIUM CHLORIDE 9 MG/ML
250 INJECTION INTRAMUSCULAR; INTRAVENOUS; SUBCUTANEOUS
Refills: 0 | Status: DISCONTINUED | OUTPATIENT
Start: 2023-10-30 | End: 2023-11-13

## 2023-10-30 RX ORDER — APIXABAN 2.5 MG/1
1 TABLET, FILM COATED ORAL
Qty: 0 | Refills: 0 | DISCHARGE

## 2023-10-30 RX ORDER — CARVEDILOL PHOSPHATE 80 MG/1
1 CAPSULE, EXTENDED RELEASE ORAL
Qty: 60 | Refills: 1
Start: 2023-10-30

## 2023-10-30 RX ORDER — ATORVASTATIN CALCIUM 80 MG/1
1 TABLET, FILM COATED ORAL
Refills: 0 | DISCHARGE

## 2023-10-30 NOTE — PROGRESS NOTE ADULT - SUBJECTIVE AND OBJECTIVE BOX
Interventional Cardiology NP post procedure note:     -s/p LHC via RRA with Dr. Lancaster: normal cors; EDP 8 (prelim report; official report to follow)      TELE: NSR    MEDICATIONS  (STANDING):  chlorhexidine 4% Liquid 1 Application(s) Topical Once  sodium chloride 0.9%. 250 milliLiter(s) (250 mL/Hr) IV Continuous <Continuous>      Allergies:  No Known Allergies      PAST MEDICAL & SURGICAL HISTORY:  DM (diabetes mellitus)      HTN (hypertension)      Hypercholesteremia      Vitamin D deficiency      Multiple myeloma      Elective surgery  Back surgery. Kyphoplasty      Elective surgery  Stem cell transplant 2013.      After cataract  left eye          Vital Signs Last 24 Hrs  T(C): 36.4 (30 Oct 2023 07:09), Max: 36.4 (30 Oct 2023 07:09)  T(F): 97.6 (30 Oct 2023 07:09), Max: 97.6 (30 Oct 2023 07:09)  HR: 81 (30 Oct 2023 08:35) (80 - 86)  BP: 129/69 (30 Oct 2023 08:35) (123/57 - 136/78)  BP(mean): --  RR: 16 (30 Oct 2023 08:35) (16 - 16)  SpO2: 95% (30 Oct 2023 08:35) (94% - 95%)    Parameters below as of 30 Oct 2023 08:35  Patient On (Oxygen Delivery Method): room air        Physical Exam:  Constitutional: NAD, AAOx3  Cardiovascular: +S1S2 RRR  Pulmonary: CTA b/l, unlabored  GI: soft NTND +BS  Extremities: no pedal edema, +distal pulses b/l  Neuro: non focal, DOTY x4  Procedure site: Right radial band in place; site benign without hematoma/bleeding; RUE warm/mobile/acyanotic; + right ulnar pulse    LABS:          RADIOLOGY & ADDITIONAL TESTS:

## 2023-10-30 NOTE — DISCHARGE NOTE PROVIDER - CARE PROVIDER_API CALL
Frederick Lancaster  Interventional Cardiology  [address], [address]  [place] [address]  Phone: [phone]  Fax: [phone]  Established Patient  Scheduled Appointment: 11/14/2023

## 2023-10-30 NOTE — PROGRESS NOTE ADULT - ASSESSMENT
A/P:  Patient with PMH of HLD, HTN, DM, PAF,  and multiple myeloma and treated with oral medications. Now in remission. Has been followed with cardiology because of stem cell transplant 2013. TTE performed on 7/11/2023 showed RV dysfunction and hypokenisis, EF 50-55%. He has c/o NDIAYE and increasing fatigue, decreased activity intolerance. Cardiac MRI was completed which was revealed an EF 38%, some concerns for possible sarcoidosis, now s/p elective LHC via RRA with Dr. Lancaster that revealed normal cors and EDP 8 (prelim report; official report to follow).    -Bedrest x 1 hour post procedure then OOB  -remove radial band one hour post procedure at 9am   -May discharge to home 2 hours post procedure at 10am if remains stable  -EDP 8--IVF hydration post procedure   -Meds: Stop metoprolol; add coreg 3.125mg PO BID for NICM (EDP low and likelihood of tolerating entresto or addition of ARB/ACEI is low)  -Plan for patient to follow with Advanced Heart failure team as outpatient  -Follow up with Dr. Lancaster in one to two weeks  -Activity instructions discussed with patient verbal understanding  -Discussed with Dr. Lancaster A/P:  Patient with PMH of HLD, HTN, DM, PAF,  and multiple myeloma and treated with oral medications. Now in remission. Has been followed with cardiology because of stem cell transplant 2013. TTE performed on 7/11/2023 showed RV dysfunction and hypokenisis, EF 50-55%. He has c/o NDIAYE and increasing fatigue, decreased activity intolerance. Cardiac MRI was completed which was revealed an EF 38%, some concerns for possible sarcoidosis, now s/p elective LHC via RRA with Dr. Lancaster that revealed normal cors and EDP 8 (prelim report; official report to follow).    -Bedrest x 1 hour post procedure then OOB  -remove radial band one hour post procedure at 9am   -May discharge to home 2 hours post procedure at 10am if remains stable  -EDP 8--IVF hydration post procedure   -Meds: Stop metoprolol; add coreg 3.125mg PO BID for NICM (EDP low and likelihood of tolerating entresto or addition of ARB/ACEI is low)  -Hold Metformin x days then restart  -Restart Eliquis this evening; resume all other meds as usual  -Plan for patient to follow with Advanced Heart failure team as outpatient  -Follow up with Dr. Lancaster in one to two weeks  -Activity instructions discussed with patient verbal understanding  -Discussed with Dr. Lancaster

## 2023-10-30 NOTE — DISCHARGE NOTE PROVIDER - NSDCCPCAREPLAN_GEN_ALL_CORE_FT
PRINCIPAL DISCHARGE DIAGNOSIS  Diagnosis: NICM (nonischemic cardiomyopathy)  Assessment and Plan of Treatment: add coreg; follow up with Advanced HF team as outpatient

## 2023-10-30 NOTE — DISCHARGE NOTE PROVIDER - HOSPITAL COURSE
Patient with PMH of HLD, HTN, DM, PAF,  and multiple myeloma and treated with oral medications. Now in remission. Has been followed with cardiology because of stem cell transplant 2013. TTE performed on 7/11/2023 showed RV dysfunction and hypokenisis, EF 50-55%. He has c/o NDIAYE and increasing fatigue, decreased activity intolerance. Cardiac MRI was completed which was revealed an EF 38%, some concerns for possible sarcoidosis, now s/p elective LHC via RRA with Dr. Lancaster that revealed normal cors and EDP 8 (prelim report; official report to follow).    -Bedrest x 1 hour post procedure then OOB  -remove radial band one hour post procedure at 9am   -May discharge to home 2 hours post procedure at 10am if remains stable  -EDP 8--IVF hydration post procedure   -Meds: Stop metoprolol; add coreg 3.125mg PO BID for NICM (EDP low and likelihood of tolerating entresto or addition of ARB/ACEI is low)  -Plan for patient to follow with Advanced Heart failure team as outpatient  -Follow up with Dr. Lancaster in one to two weeks  -Activity instructions discussed with patient verbal understanding  -Discussed with Dr. Lancaster   Patient with PMH of HLD, HTN, DM, PAF,  and multiple myeloma and treated with oral medications. Now in remission. Has been followed with cardiology because of stem cell transplant 2013. TTE performed on 7/11/2023 showed RV dysfunction and hypokenisis, EF 50-55%. He has c/o NDIAYE and increasing fatigue, decreased activity intolerance. Cardiac MRI was completed which was revealed an EF 38%, some concerns for possible sarcoidosis, now s/p elective LHC via RRA with Dr. Lancaster that revealed normal cors and EDP 8 (prelim report; official report to follow).    -Bedrest x 1 hour post procedure then OOB  -remove radial band one hour post procedure at 9am   -May discharge to home 2 hours post procedure at 10am if remains stable  -EDP 8--IVF hydration post procedure   -Meds: Stop metoprolol; add coreg 3.125mg PO BID for NICM (EDP low and likelihood of tolerating entresto or addition of ARB/ACEI is low)  -Restart Eliquis this evening; hold metformin x 48 hours then restart; resume all other meds as usual   -Plan for patient to follow with Advanced Heart failure team as outpatient  -Follow up with Dr. Lancaster in one to two weeks  -Activity instructions discussed with patient verbal understanding  -Discussed with Dr. Lancaster

## 2023-10-30 NOTE — DISCHARGE NOTE NURSING/CASE MANAGEMENT/SOCIAL WORK - NSDCPEFALRISK_GEN_ALL_CORE
For information on Fall & Injury Prevention, visit: [email or web address] OR  [email or web address] OR  https://www.cdc.gov/steadi/patient.html

## 2023-10-30 NOTE — DISCHARGE NOTE PROVIDER - NSDCMRMEDTOKEN_GEN_ALL_CORE_FT
acyclovir 400 mg oral tablet: 1 tab(s) orally once a day  aspirin 81 mg oral delayed release tablet: 1 tab(s) orally once a day  carvedilol 3.125 mg oral tablet: 1 tab(s) orally every 12 hours  dexAMETHasone 4 mg oral tablet: 1 tab(s) orally Take 5 pills by mouth once a week  Eliquis 5 mg oral tablet: 1 tab(s) orally 2 times a day Restart this evening  glimepiride 2 mg oral tablet: 1 tab(s) orally once a day  Januvia 50 mg oral tablet: 1 tab(s) orally once a day  Lipitor 10 mg oral tablet: 1 tab(s) orally once a day (at bedtime)  metFORMIN 500 mg oral tablet: 1 tab(s) orally 2 times a day Hold for 48 hours--restart 11/2/23  ocular lubricant ophthalmic solution: 1 drop(s) to each affected eye 4 times a day  Pomalyst 4 mg oral capsule: 1 cap(s) orally once a day   acyclovir 400 mg oral tablet: 1 tab(s) orally once a day  aspirin 81 mg oral delayed release tablet: 1 tab(s) orally once a day  carvedilol 3.125 mg oral tablet: 1 tab(s) orally every 12 hours  Coreg 3.125 mg oral tablet: 1 tab(s) orally 2 times a day  dexAMETHasone 4 mg oral tablet: 1 tab(s) orally Take 5 pills by mouth once a week  Eliquis 5 mg oral tablet: 1 tab(s) orally 2 times a day Restart this evening  glimepiride 2 mg oral tablet: 1 tab(s) orally once a day  Januvia 50 mg oral tablet: 1 tab(s) orally once a day  Lipitor 10 mg oral tablet: 1 tab(s) orally once a day (at bedtime)  metFORMIN 500 mg oral tablet: 1 tab(s) orally 2 times a day Hold for 48 hours--restart 11/2/23  ocular lubricant ophthalmic solution: 1 drop(s) to each affected eye 4 times a day  Pomalyst 4 mg oral capsule: 1 cap(s) orally once a day

## 2023-10-30 NOTE — DISCHARGE NOTE NURSING/CASE MANAGEMENT/SOCIAL WORK - PATIENT PORTAL LINK FT
You can access the FollowMyHealth Patient Portal offered by [facility] by registering at the following website: [email or web address]. By joining [facility]’s FollowMyHealth portal, you will also be able to view your health information using other applications (apps) compatible with our system.

## 2023-10-30 NOTE — DISCHARGE NOTE PROVIDER - NSDCFUSCHEDAPPT_GEN_ALL_CORE_FT
Frederick Lancaster  [facility] Physician [facility]  CARDIOLOGY [address]  Scheduled Appointment: 11/14/2023

## 2023-11-02 DIAGNOSIS — I42.8 OTHER CARDIOMYOPATHIES: ICD-10-CM

## 2023-11-14 ENCOUNTER — APPOINTMENT (OUTPATIENT)
Dept: CARDIOLOGY | Facility: CLINIC | Age: 68
End: 2023-11-14
Payer: MEDICARE

## 2023-11-14 VITALS
HEART RATE: 76 BPM | TEMPERATURE: 97.6 F | DIASTOLIC BLOOD PRESSURE: 72 MMHG | HEIGHT: 68 IN | OXYGEN SATURATION: 98 % | SYSTOLIC BLOOD PRESSURE: 126 MMHG | BODY MASS INDEX: 34.86 KG/M2 | WEIGHT: 230 LBS

## 2023-11-14 PROCEDURE — 99214 OFFICE O/P EST MOD 30 MIN: CPT

## 2023-11-14 RX ORDER — METOPROLOL SUCCINATE 25 MG/1
25 TABLET, EXTENDED RELEASE ORAL
Qty: 90 | Refills: 3 | Status: DISCONTINUED | COMMUNITY
Start: 2021-12-29 | End: 2023-11-14

## 2023-11-19 LAB
ALBUMIN SERPL ELPH-MCNC: 4.4 G/DL
ALP BLD-CCNC: 82 U/L
ALT SERPL-CCNC: 22 U/L
ANION GAP SERPL CALC-SCNC: 17 MMOL/L
AST SERPL-CCNC: 11 U/L
BILIRUB SERPL-MCNC: 0.4 MG/DL
BUN SERPL-MCNC: 26 MG/DL
CALCIUM SERPL-MCNC: 9.9 MG/DL
CHLORIDE SERPL-SCNC: 102 MMOL/L
CO2 SERPL-SCNC: 23 MMOL/L
CREAT SERPL-MCNC: 0.72 MG/DL
EGFR: 101 ML/MIN/1.73M2
GLUCOSE SERPL-MCNC: 313 MG/DL
NT-PROBNP SERPL-MCNC: 85 PG/ML
POTASSIUM SERPL-SCNC: 4.4 MMOL/L
PROT SERPL-MCNC: 6.7 G/DL
SODIUM SERPL-SCNC: 141 MMOL/L

## 2023-11-29 ENCOUNTER — APPOINTMENT (OUTPATIENT)
Dept: HEART FAILURE | Facility: CLINIC | Age: 68
End: 2023-11-29
Payer: MEDICARE

## 2023-11-29 VITALS
HEIGHT: 68 IN | HEART RATE: 64 BPM | WEIGHT: 230 LBS | BODY MASS INDEX: 34.86 KG/M2 | OXYGEN SATURATION: 96 % | DIASTOLIC BLOOD PRESSURE: 54 MMHG | SYSTOLIC BLOOD PRESSURE: 106 MMHG

## 2023-11-29 DIAGNOSIS — Z86.718 PERSONAL HISTORY OF OTHER VENOUS THROMBOSIS AND EMBOLISM: ICD-10-CM

## 2023-11-29 PROCEDURE — 99205 OFFICE O/P NEW HI 60 MIN: CPT

## 2023-12-13 ENCOUNTER — APPOINTMENT (OUTPATIENT)
Dept: CARDIOLOGY | Facility: CLINIC | Age: 68
End: 2023-12-13
Payer: MEDICARE

## 2023-12-13 PROCEDURE — A9538 TC99M PYROPHOSPHATE: CPT

## 2023-12-13 PROCEDURE — 78803 RP LOCLZJ TUM SPECT 1 AREA: CPT

## 2024-01-24 ENCOUNTER — APPOINTMENT (OUTPATIENT)
Dept: CARDIOLOGY | Facility: CLINIC | Age: 69
End: 2024-01-24
Payer: MEDICARE

## 2024-01-24 VITALS
DIASTOLIC BLOOD PRESSURE: 60 MMHG | BODY MASS INDEX: 33.49 KG/M2 | OXYGEN SATURATION: 97 % | HEIGHT: 68 IN | WEIGHT: 221 LBS | HEART RATE: 83 BPM | SYSTOLIC BLOOD PRESSURE: 106 MMHG

## 2024-01-24 PROCEDURE — 99215 OFFICE O/P EST HI 40 MIN: CPT

## 2024-01-24 RX ORDER — YOHIMBE BARK 500 MG
100 CAPSULE ORAL DAILY
Refills: 0 | Status: DISCONTINUED | COMMUNITY
End: 2024-01-24

## 2024-01-24 RX ORDER — KETOCONAZOLE 20 MG/G
2 CREAM TOPICAL
Qty: 60 | Refills: 0 | Status: DISCONTINUED | COMMUNITY
Start: 2021-04-05 | End: 2024-01-24

## 2024-01-24 RX ORDER — KETOCONAZOLE 20.5 MG/ML
2 SHAMPOO, SUSPENSION TOPICAL
Qty: 120 | Refills: 0 | Status: DISCONTINUED | COMMUNITY
Start: 2021-04-05 | End: 2024-01-24

## 2024-01-24 RX ORDER — CARVEDILOL 3.12 MG/1
3.12 TABLET, FILM COATED ORAL
Qty: 180 | Refills: 1 | Status: DISCONTINUED | COMMUNITY
Start: 2023-11-14 | End: 2024-01-24

## 2024-01-24 NOTE — HISTORY OF PRESENT ILLNESS
[FreeTextEntry1] : 68-year-old male with NICM, A-fib on Eliquis, IgG Kappa MM s/p stem cell transplant in 2013 maintained on pomlidomide > 10 years, DVT (2019), SERGEI (on CPAP) presents today for heart failure follow-up.  Patient originally was seen by cardiology back in 2013 in the setting of preoperative evaluation for his stem cell transplant.  However, throughout 2023 he developed increasing fatigue and shortness of breath in the absence of chest pain.  He required hospitalization at [facility] and was found to have pneumonia, new onset atrial fibrillation along with a echocardiogram that revealed a depressed ejection fraction of 43%.  He had outpatient workup with cardiac catheterization in November 2023 that did not reveal any obstructive CAD.  He did have follow-up MRI which showed depressed LV function at 38% with delayed LGE in the mid wall of the lateral wall concerning for possible infiltrative process.  He continued to fatigue quickly with exertion.  I first met him on 11/29/2023.  At that visit we had ordered repeat cardiac MRI as well as PYP scan.  He was started on spironolactone.    Today, he reports 1 episode of lightheadedness and dizziness that correlated with a BP of 84/63.    Home Vitals Log:. Weight range 221.6-226.6 lbs. Highest weight fluctuation was around the time of the holidays and notes that he had a high salt intake at that time. BP 84/63 - 104/67  Today, he reports feeling much improved.  He no longer needs to stop as frequently when walking around the building or doing household activities.  He is unable to walk around the building without needing to stop.  He sleeps on multiple pillows at night however this has been ongoing for many years in the setting of his SERGEI.  He denies chest pain, palpitations, dyspnea at rest or exertion, orthopnea, or leg swelling.  He does not regularly check his weight or blood pressure at home.

## 2024-01-24 NOTE — REASON FOR VISIT
[Cardiac Failure] : cardiac failure [FreeTextEntry1] : General /interventional cardiology: Dr. Lancaster

## 2024-01-24 NOTE — ASSESSMENT
[FreeTextEntry1] : 68-year-old male with NICM, A-fib on Eliquis, MM s/p stem cell transplant in 2013, DVT (2019), SERGEI (on CPAP) presents today for heart failure follow-up.  He has ACC/AHA stage C cardiomyopathy with NYHA class I-II symptoms.

## 2024-01-24 NOTE — CARDIOLOGY SUMMARY
[de-identified] : Nuclear SPECT 8/27/2021: LVEF 50%, small moderate defect in the apical wall that is fixed. [de-identified] : 7/21/2023: LVEF 55%, LVEDD 5.3 cm, IVSD 1.3 cm, LV cavity with trabeculation/false tendon, hypokinetic RV, PASP 37, mild MR. 8/25/2021: LVEF 55%, LVEDD 5.7 cm, IVSD 1.2 cm, 3.8cm aortic root (dilated)/ 3.8 cm ascending aorta (dilated)/aortic arch 3.5 cm, mild to moderate TR. [de-identified] : cMRI 12/20/23: LVEF 45%, RV qulaitatively normal,  LVEDV 99ML (normal), no LGE. cMRI 8/30/23: LVEF 38%, RVEF 38%, LVEDD 113 mL (normal), delayed LGE patchy mid wall of the lateral wall -possible infiltrative process such as sarcoid. [de-identified] : PYP scan 12/13/2023:  H:CL ratio of 1.10.  Not suggestive of TTR amyloid. [de-identified] : [facility] 10/30/2023: No CAD.

## 2024-01-24 NOTE — PHYSICAL EXAM
[Normal] : normal gait [No Edema] : no edema [No Rash] : no rash [Moves all extremities] : moves all extremities [Alert and Oriented] : alert and oriented [de-identified] : DVP low at clavicle. [de-identified] : Warm

## 2024-01-24 NOTE — DISCUSSION/SUMMARY
[FreeTextEntry1] : # HFrEF  - ETIOLOGY: NICM with mid endocardial LGE in the lateral wall seen on cMRI from Aug 2023.  This could represent a number of possible CM entities including sarcoidosis, amyloid (AL or TTR with his MM history), and side effect from MM medications (was previously on ixazomib but stopped due to neuropathy).  He does have a history of MM status post stem cell transplant.  Repeat cMRI on 12/2023 showed resolution of this LGE.  PYP scan was also negative for TTR amyloid.  This resolving LGE makes me less suspicious of amyloid.  Such fluctuating LGE may be seen in sarcoidosis and/or possible myocarditis.  He did have pneumonia at the time MRI was done.   - will plan to continuing serial cMRIs and every 6 months.  -GDMT: Currently on carvedilol 3.125 mg twice daily, Entresto 24 -26 mg twice daily, and spironolactone 25mg daily. - Will change carvedilol to metoprolol 25mg XL daily in setting of lower BP. - can consider SLGT2i.  However, he has a low proBNP.  Will need to be careful about overdiuresis. -Last labs: 11/14/2023: K4.4, CR 0.72, proBNP 85. - he had lab work done with [facility] last Friday, will obtain these results.   - DIURETICS: Euvolemic on exam.  Not currently on standing diuretics.  He is getting some diuretic effect with his Entresto.  I asked him to watch his weight at home should he see greater than a 2lb increase in 1 day, can consider as needed diuretics. - BP/weight log provided.  - DEVICE: None.  LVEF now > 35%.  ICD currently not indicated.  - ADVANCED THERAPIES:  has good quality of life and his in a compensated HF state.  No need to consider at this time.  - REHAB: Would benefit from cardiac rehab.  Will continue to discuss with him.  - SLEEP: Has SERGEI compliant with CPAP.  # IgG Kappa multiple myeloma s/p stem cell transplant in 2013 -Currently maintained on Pomalyst (pomalidomide).  Was previously on Ninlaro (Ixazomib) but taken off due to neuropathy. This has a lower CV risk profile that others in its class. -  last records from Dr. Ronald Gibbs, [facility] [place] or [facility] (Fax # [phone]) - spoke with Dr. Gibbs on 1/24/24 -results of CT MRI and PYP discussed.  Low suspicion for cardiac amyloid at this time.  Thus, no plans to change his MM therapy yet. - Last Labs: K/L ratio 20.16, K 15/L 0.77.    F/U with me for HF visit in May.  Will plan to order cMRI at that time.

## 2024-03-05 RX ORDER — SACUBITRIL AND VALSARTAN 24; 26 MG/1; MG/1
24-26 TABLET, FILM COATED ORAL TWICE DAILY
Qty: 180 | Refills: 1 | Status: ACTIVE | COMMUNITY
Start: 2023-11-14 | End: 1900-01-01

## 2024-03-12 ENCOUNTER — APPOINTMENT (OUTPATIENT)
Dept: CARDIOLOGY | Facility: CLINIC | Age: 69
End: 2024-03-12
Payer: MEDICARE

## 2024-03-12 ENCOUNTER — NON-APPOINTMENT (OUTPATIENT)
Age: 69
End: 2024-03-12

## 2024-03-12 VITALS
OXYGEN SATURATION: 93 % | BODY MASS INDEX: 35.16 KG/M2 | WEIGHT: 224 LBS | HEIGHT: 67 IN | SYSTOLIC BLOOD PRESSURE: 90 MMHG | DIASTOLIC BLOOD PRESSURE: 60 MMHG | HEART RATE: 103 BPM

## 2024-03-12 DIAGNOSIS — I48.91 UNSPECIFIED ATRIAL FIBRILLATION: ICD-10-CM

## 2024-03-12 PROCEDURE — 93000 ELECTROCARDIOGRAM COMPLETE: CPT

## 2024-03-12 PROCEDURE — G2211 COMPLEX E/M VISIT ADD ON: CPT

## 2024-03-12 PROCEDURE — 99214 OFFICE O/P EST MOD 30 MIN: CPT

## 2024-03-12 NOTE — DISCUSSION/SUMMARY
[FreeTextEntry1] : 1. Shortness of breath: similar. Improved weights.  2. DVT: ? of old vs new. Difficult to discern. Will continue eliquis.  3. Afib: on eliquis. No further events. AC for life. On beta blockers.  4. sleep apnea- stable.  5. CHF/CM- On entresto low dose. BPs low. Will hold on any changes at this time. EF normalized.  6. FOllow up in 6 months.   [EKG obtained to assist in diagnosis and management of assessed problem(s)] : EKG obtained to assist in diagnosis and management of assessed problem(s)

## 2024-03-12 NOTE — HISTORY OF PRESENT ILLNESS
[FreeTextEntry1] : Patient with history of multiple myeloma and treated with oral medications. Now in remission.  Has been followed with cardiology because of stem cell transplant 2013.  Over the past year has been fatiguing quickly, shortness of breath, no chest pain.  Had been coughing, urgent care and admitted to [facility] and was diagnosed with Pneumonia. Received IV abx, O2, 6 days. During hospitalization, noted to have afib. Now converted to NSR. TTe with EF 43%, CT of chest abd/pelvis without contrast.  Sleep apnea- Treated with CPAP.  DVT- 2017 ? of [facility] with a possible intervention. ? of PE. Has been on warfarin.  Stress testing - 2 years prior.  PFTs with Dr. Tinoco 2019.   9/2021- Diagnosed with left leg DVT. Started on eliquis.  Underwent echo and stress test. Echo with normal EF. Stress with apical artifact. BNP normal.   7/2023- Limited in physical activity due to orthopedic limitations.   10/2023- Cardiac MRI with EF 38%. Plan for cardiac cath.   11/2023- Normal cardiac cath. Has sleep apnea.   3/2024- Doing well. Has been seen by [facility]. BPs at home high 90s to 100 range.

## 2024-03-23 LAB — HBA1C MFR BLD HPLC: 10.6

## 2024-05-15 ENCOUNTER — RX RENEWAL (OUTPATIENT)
Age: 69
End: 2024-05-15

## 2024-05-15 RX ORDER — SPIRONOLACTONE 25 MG/1
25 TABLET ORAL DAILY
Qty: 90 | Refills: 1 | Status: ACTIVE | COMMUNITY
Start: 2023-11-29 | End: 1900-01-01

## 2024-05-17 ENCOUNTER — LABORATORY RESULT (OUTPATIENT)
Age: 69
End: 2024-05-17

## 2024-05-22 ENCOUNTER — APPOINTMENT (OUTPATIENT)
Dept: HEART FAILURE | Facility: CLINIC | Age: 69
End: 2024-05-22
Payer: MEDICARE

## 2024-05-22 VITALS
HEART RATE: 81 BPM | WEIGHT: 224 LBS | OXYGEN SATURATION: 98 % | HEIGHT: 67 IN | DIASTOLIC BLOOD PRESSURE: 60 MMHG | BODY MASS INDEX: 35.16 KG/M2 | SYSTOLIC BLOOD PRESSURE: 100 MMHG

## 2024-05-22 DIAGNOSIS — I42.8 OTHER CARDIOMYOPATHIES: ICD-10-CM

## 2024-05-22 DIAGNOSIS — C90.00 MULTIPLE MYELOMA NOT HAVING ACHIEVED REMISSION: ICD-10-CM

## 2024-05-22 DIAGNOSIS — I50.9 HEART FAILURE, UNSPECIFIED: ICD-10-CM

## 2024-05-22 PROCEDURE — 99215 OFFICE O/P EST HI 40 MIN: CPT

## 2024-05-22 NOTE — PHYSICAL EXAM
[Normal] : normal gait [No Edema] : no edema [No Rash] : no rash [Moves all extremities] : moves all extremities [Alert and Oriented] : alert and oriented [de-identified] : JVP low at clavicle. [de-identified] : Warm

## 2024-05-22 NOTE — HISTORY OF PRESENT ILLNESS
[FreeTextEntry1] : 68-year-old male with NICM, A-fib on Eliquis, IgG Kappa MM s/p stem cell transplant in 2013 maintained on pomlidomide > 10 years, DVT (2019), SERGEI (on CPAP) presents today for heart failure follow-up.  Patient originally was seen by cardiology back in 2013 in the setting of preoperative evaluation for his stem cell transplant.  However, throughout 2023 he developed increasing fatigue and shortness of breath in the absence of chest pain.  He required hospitalization at [facility] and was found to have pneumonia, new onset atrial fibrillation along with a echocardiogram that revealed a depressed ejection fraction of 43%.  He had outpatient workup with cardiac catheterization in November 2023 that did not reveal any obstructive CAD.  He did have follow-up MRI which showed depressed LV function at 38% with delayed LGE in the mid wall of the lateral wall concerning for possible infiltrative process.  He continued to fatigue quickly with exertion.  I first met him on 11/29/2023.  We had begun a work-up for possible cardiac amyloid.  PYP scan done on 12/13/2023 was not suggestive of TTR amyloid (H: CL ratio of 1.10).  Repeat cMRI was also performed and showed resolved LGE but LVEF was 45%.  He was started on spironolactone.  He has some episodes of hypotension and carvedilol was switched to metoprolol 25 mg XL daily.  He notes that his BP and weight are now stable.  Did not bring log today.  No further dizziness episodes.  Fortunately, he had a fall on 2/14/2024 and broke his ribs.  This set him back from a mobility standpoint.  He has very little stamina doing things from this but not from a dyspnea standpoint.  He sleeps on multiple pillows at night however this has been ongoing for many years in the setting of his SERGEI.  He denies chest pain, palpitations, dyspnea at rest or exertion, orthopnea, or leg swelling.  He notes his neuropathy is worsening and is now progressed from his lower extremities to his upper extremities.  He notes this is a side effect of his chemotherapy.

## 2024-05-22 NOTE — DISCUSSION/SUMMARY
[FreeTextEntry1] : # HFrEF  - ETIOLOGY: NICM with mid endocardial LGE in the lateral wall seen on cMRI from Aug 2023.  This could represent a number of possible CM entities including sarcoidosis, amyloid (AL or TTR with his MM history), and side effect from MM medications (was previously on ixazomib but stopped due to neuropathy).  He does have a history of MM status post stem cell transplant.  Repeat cMRI on 12/2023 showed resolution of this LGE.  PYP scan was also negative for TTR amyloid.  This resolving LGE makes me less suspicious of amyloid.  Such fluctuating LGE may be seen in sarcoidosis and/or possible myocarditis.  He did have pneumonia at the time MRI was done.   - will plan to continuing serial cMRIs, next MRI ordered.  -GDMT: Currently on metoprolol 25 mg XL daily, Entresto 24 -26 mg twice daily, and spironolactone 25mg daily. - I do not think we will be able to tolerate SLGT2i due to low volume status and low proBNP.  Will need to be careful about overdiuresis.  - LABS: - 11/14/2023: K4.4, Cr 0.72, proBNP 85. - 5/17/2024: K4.8, Cr 0.84, proBNP 68.  - DIURETICS: Euvolemic on exam.  Not currently on standing diuretics.  He is getting some diuretic effect with his Entresto.  I asked him to watch his weight at home should he see greater than a 2lb increase in 1 day, can consider as needed diuretics. - BP/weight log provided.  - DEVICE: None.  LVEF now > 35%.  ICD currently not indicated.  Will reases with cMRI.  - ADVANCED THERAPIES:  has good quality of life and his in a compensated HF state.  No need to consider at this time.  - REHAB: Would benefit from cardiac rehab.  Will continue to discuss with him.  - SLEEP: Has SERGEI compliant with CPAP.  # IgG Kappa multiple myeloma s/p stem cell transplant in 2013 -Currently maintained on Pomalyst (pomalidomide).  Was previously on Ninlaro (Ixazomib) but taken off due to neuropathy. This has a lower CV risk profile that others in its class. -  last records from Dr. Ronald Gibbs, [facility] [place] or [facility] (Fax # [phone]) - spoke with Dr. Gibbs on 1/24/24 -results of CT MRI and PYP discussed.  Low suspicion for cardiac amyloid at this time.  Thus, no plans to change his MM therapy yet. -Will obtain repeat cardiac MRI to monitor for any need from a cardiac standpoint to change his MM therapy.  F/U with Dr. Lancaster in Sept and with me HF visit in December.

## 2024-05-22 NOTE — CARDIOLOGY SUMMARY
[de-identified] : Nuclear SPECT 8/27/2021: LVEF 50%, small moderate defect in the apical wall that is fixed. [de-identified] : 7/21/2023: LVEF 55%, LVEDD 5.3 cm, IVSD 1.3 cm, LV cavity with trabeculation/false tendon, hypokinetic RV, PASP 37, mild MR. 8/25/2021: LVEF 55%, LVEDD 5.7 cm, IVSD 1.2 cm, 3.8cm aortic root (dilated)/ 3.8 cm ascending aorta (dilated)/aortic arch 3.5 cm, mild to moderate TR. [de-identified] : cMRI 12/20/23: LVEF 45%, RV qulaitatively normal,  LVEDV 99ML (normal), no LGE. cMRI 8/30/23: LVEF 38%, RVEF 38%, LVEDD 113 mL (normal), delayed LGE patchy mid wall of the lateral wall -possible infiltrative process such as sarcoid. [de-identified] : PYP scan 12/13/2023:  H:CL ratio of 1.10.  Not suggestive of TTR amyloid. [de-identified] : [facility] 10/30/2023: No CAD.

## 2024-05-25 RX ORDER — METOPROLOL SUCCINATE 25 MG/1
25 TABLET, EXTENDED RELEASE ORAL DAILY
Qty: 90 | Refills: 0 | Status: ACTIVE | COMMUNITY
Start: 2024-01-24 | End: 1900-01-01

## 2024-07-09 ENCOUNTER — APPOINTMENT (OUTPATIENT)
Dept: CARDIOLOGY | Facility: CLINIC | Age: 69
End: 2024-07-09

## 2024-08-27 ENCOUNTER — APPOINTMENT (OUTPATIENT)
Dept: UROLOGY | Facility: CLINIC | Age: 69
End: 2024-08-27

## 2024-09-10 ENCOUNTER — APPOINTMENT (OUTPATIENT)
Dept: CARDIOLOGY | Facility: CLINIC | Age: 69
End: 2024-09-10

## 2025-03-13 ENCOUNTER — RX RENEWAL (OUTPATIENT)
Age: 70
End: 2025-03-13

## 2025-05-09 ENCOUNTER — RX RENEWAL (OUTPATIENT)
Age: 70
End: 2025-05-09

## 2025-06-16 ENCOUNTER — NON-APPOINTMENT (OUTPATIENT)
Age: 70
End: 2025-06-16

## 2025-06-16 ENCOUNTER — APPOINTMENT (OUTPATIENT)
Dept: CARDIOLOGY | Facility: CLINIC | Age: 70
End: 2025-06-16
Payer: MEDICARE

## 2025-06-16 VITALS
WEIGHT: 230 LBS | HEART RATE: 97 BPM | DIASTOLIC BLOOD PRESSURE: 58 MMHG | SYSTOLIC BLOOD PRESSURE: 98 MMHG | OXYGEN SATURATION: 96 % | HEIGHT: 67 IN | BODY MASS INDEX: 36.1 KG/M2

## 2025-06-16 PROCEDURE — G2211 COMPLEX E/M VISIT ADD ON: CPT

## 2025-06-16 PROCEDURE — 99215 OFFICE O/P EST HI 40 MIN: CPT

## 2025-06-16 PROCEDURE — 93356 MYOCRD STRAIN IMG SPCKL TRCK: CPT

## 2025-06-16 PROCEDURE — 93306 TTE W/DOPPLER COMPLETE: CPT

## 2025-06-16 PROCEDURE — 93000 ELECTROCARDIOGRAM COMPLETE: CPT
